# Patient Record
Sex: MALE | Race: OTHER | HISPANIC OR LATINO | Employment: FULL TIME | URBAN - METROPOLITAN AREA
[De-identification: names, ages, dates, MRNs, and addresses within clinical notes are randomized per-mention and may not be internally consistent; named-entity substitution may affect disease eponyms.]

---

## 2017-02-09 ENCOUNTER — OFFICE VISIT (OUTPATIENT)
Dept: MEDICAL GROUP | Facility: MEDICAL CENTER | Age: 52
End: 2017-02-09
Payer: COMMERCIAL

## 2017-02-09 VITALS
SYSTOLIC BLOOD PRESSURE: 118 MMHG | HEART RATE: 80 BPM | RESPIRATION RATE: 16 BRPM | WEIGHT: 201 LBS | OXYGEN SATURATION: 94 % | DIASTOLIC BLOOD PRESSURE: 62 MMHG | BODY MASS INDEX: 33.49 KG/M2 | HEIGHT: 65 IN | TEMPERATURE: 98.4 F

## 2017-02-09 DIAGNOSIS — E66.9 OBESITY (BMI 30-39.9): ICD-10-CM

## 2017-02-09 DIAGNOSIS — K21.9 GASTROESOPHAGEAL REFLUX DISEASE WITHOUT ESOPHAGITIS: ICD-10-CM

## 2017-02-09 DIAGNOSIS — Z00.00 ANNUAL PHYSICAL EXAM: ICD-10-CM

## 2017-02-09 DIAGNOSIS — G47.33 OSA (OBSTRUCTIVE SLEEP APNEA): ICD-10-CM

## 2017-02-09 DIAGNOSIS — L60.0 NAIL, INGROWN: ICD-10-CM

## 2017-02-09 PROCEDURE — 99396 PREV VISIT EST AGE 40-64: CPT | Performed by: NURSE PRACTITIONER

## 2017-02-09 ASSESSMENT — PATIENT HEALTH QUESTIONNAIRE - PHQ9: CLINICAL INTERPRETATION OF PHQ2 SCORE: 0

## 2017-02-09 NOTE — MR AVS SNAPSHOT
"Judah Olivera   2017 2:00 PM   Office Visit   MRN: [id number]    Department:  [facility]   Dept Phone:  [phone]    Description:  Male : 1965   Provider:  INDIO Keen           Reason for Visit     Follow-Up           Allergies as of 2017     No Known Allergies      You were diagnosed with     Annual physical exam   [296009]       Gastroesophageal reflux disease without esophagitis   [007391]       Nail, ingrown   [005589]       JOSE (obstructive sleep apnea)   [234386]         Vital Signs     Blood Pressure Pulse Temperature Respirations Height Weight    118/62 mmHg 80 36.9 °C (98.4 °F) 16 1.651 m (5' 5\") 91.173 kg (201 lb)    Body Mass Index Oxygen Saturation Smoking Status             33.45 kg/m2 94% Former Smoker         Basic Information     Date Of Birth Sex Race Ethnicity Preferred Language    1965 Male Other  Origin (Korean,Rwandan,North Korean,[place]end[place], etc) English      Problem List              ICD-10-CM Priority Class Noted - Resolved    Adult BMI 32.0-32.9 kg/sq m Z68.32   2016 - Present    Gout M10.9   2016 - Present    History of hematuria Z87.448   2016 - Present    Gastroesophageal reflux disease without esophagitis K21.9   2016 - Present    Prediabetes R73.03   2016 - Present    JOSE (obstructive sleep apnea) G47.33   10/5/2016 - Present      Health Maintenance        Date Due Completion Dates    IMM DTaP/Tdap/Td Vaccine (1 - Tdap) 1985 ---    IMM INFLUENZA (1) 2016 ---    COLONOSCOPY 2026            Current Immunizations     No immunizations on file.      Below and/or attached are the medications your provider expects you to take. Review all of your home medications and newly ordered medications with your provider and/or pharmacist. Follow medication instructions as directed by your provider and/or pharmacist. Please keep your medication list with you and share with your provider. Update the " information when medications are discontinued, doses are changed, or new medications (including over-the-counter products) are added; and carry medication information at all times in the event of emergency situations     Allergies:  No Known Allergies          Medications  Valid as of: February 09, 2017 -  3:23 PM    Generic Name Brand Name Tablet Size Instructions for use    Acetaminophen   Take  by mouth.        Cyclobenzaprine HCl (Tab) FLEXERIL 10 MG Take 1 Tab by mouth 3 times a day as needed.        Ibuprofen (Tab) MOTRIN 200 MG Take 200 mg by mouth every 6 hours as needed.        Indomethacin (Cap) INDOCIN 50 MG Take 1 Cap by mouth 3 times a day.        Naproxen (Tab) NAPROSYN 500 MG 1 tab PO twice daily x 1 week then twice daily as needed, take with food        Zolpidem Tartrate (Tab) AMBIEN 5 MG Take 1-2 tablets by mouth every evening as needed for insomnia. Bring to sleep study.        .                 Medicines prescribed today were sent to:     [facility] [address] [place] [address] (S), [place] - [address]    [facility] ([place] [place] [address]    Phone: [phone] Fax: [phone]    Open 24 Hours?: No      Medication refill instructions:       If your prescription bottle indicates you have medication refills left, it is not necessary to call your provider’s office. Please contact your pharmacy and they will refill your medication.    If your prescription bottle indicates you do not have any refills left, you may request refills at any time through one of the following ways: The online [employer] system (except Urgent Care), by calling your provider’s office, or by asking your pharmacy to contact your provider’s office with a refill request. Medication refills are processed only during regular business hours and may not be available until the next business day. Your provider may request additional information or to have a follow-up visit with you prior to refilling your medication.   *Please Note:  Medication refills are assigned a new Rx number when refilled electronically. Your pharmacy may indicate that no refills were authorized even though a new prescription for the same medication is available at the pharmacy. Please request the medicine by name with the pharmacy before contacting your provider for a refill.        Your To Do List     Future Labs/Procedures Complete By Expires    COMP METABOLIC PANEL  As directed 2/10/2018    LIPID PROFILE  As directed 2/10/2018      Referral     A referral request has been sent to our patient care coordination department. Please allow 3-5 business days for us to process this request and contact you either by phone or mail. If you do not hear from us by the 5th business day, please call us at [phone].           Verisim Access Code: [email or web address]  Expires: 2/23/2017 11:26 AM    Verisim  A secure, online tool to manage your health information     [employer]’s Verisim® is a secure, online tool that connects you to your personalized health information from the privacy of your home -- day or night - making it very easy for you to manage your healthcare. Once the activation process is completed, you can even access your medical information using the Verisim nemo, which is available for free in the Apple Nemo store or Google Play store.     Verisim provides the following levels of access (as shown below):   My Chart Features   [facility] Doctor [facility]  [facility]ists [facility]  Urgent  Care Non-[facility]  Primary Care  Doctor   Email your healthcare team securely and privately 24/7 X X X    Manage appointments: schedule your next appointment; view details of past/upcoming appointments X      Request prescription refills. X      View recent personal medical records, including lab and immunizations X X X X   View health record, including health history, allergies, medications X X X X   Read reports about your outpatient visits, procedures, consult and ER notes X X X X    See your discharge summary, which is a recap of your hospital and/or ER visit that includes your diagnosis, lab results, and care plan. X X       How to register for [employer]:  1. Go to  [email or web address].  2. Click on the Sign Up Now box, which takes you to the New Member Sign Up page. You will need to provide the following information:  a. Enter your [employer] Access Code exactly as it appears at the top of this page. (You will not need to use this code after you’ve completed the sign-up process. If you do not sign up before the expiration date, you must request a new code.)   b. Enter your date of birth.   c. Enter your home email address.   d. Click Submit, and follow the next screen’s instructions.  3. Create a [employer] ID. This will be your [employer] login ID and cannot be changed, so think of one that is secure and easy to remember.  4. Create a [employer] password. You can change your password at any time.  5. Enter your Password Reset Question and Answer. This can be used at a later time if you forget your password.   6. Enter your e-mail address. This allows you to receive e-mail notifications when new information is available in [employer].  7. Click Sign Up. You can now view your health information.    For assistance activating your [employer] account, call [phone]

## 2017-02-09 NOTE — ASSESSMENT & PLAN NOTE
Using CPAP most nights of the week  Sleeping well  Good energy level, no daytime fatigue  He is due for follow-up with pulmonology

## 2017-02-09 NOTE — PROGRESS NOTES
"Subjective:     Chief Complaint   Patient presents with   • Follow-Up     Judah Olivera is a 51 y.o. male here today for annual exam and labs as required by insurance. He has no acute complaints. We discussed:    Gastroesophageal reflux disease without esophagitis  Managed with prilosec, has tried to d/c but symptoms return  Asymptomatic with medication  JOSE (obstructive sleep apnea)  Using CPAP most nights of the week  Sleeping well  Good energy level, no daytime fatigue  He is due for follow-up with pulmonology  Obesity (BMI 30-39.9)  Gradual weight gain, admits that he is not exercising or watching his diet     Colonoscopy recently completed and normal, mild prostate enlargement noted. He denies urinary difficulty, hesitancy, frequency    Current medicines (including changes today)  Current Outpatient Prescriptions   Medication Sig Dispense Refill   • Acetaminophen (ARTHRITIS PAIN RELIEF PO) Take  by mouth.     • ibuprofen (MOTRIN) 200 MG Tab Take 200 mg by mouth every 6 hours as needed.       No current facility-administered medications for this visit.     He  has a past medical history of Hyperlipidemia; Back pain; Sleep apnea; and Chickenpox. He also has no past medical history of Asthma or Diabetes (CMS-[facility]).    ROS included above     Objective:     Blood pressure 118/62, pulse 80, temperature 36.9 °C (98.4 °F), resp. rate 16, height 1.651 m (5' 5\"), weight 91.173 kg (201 lb), SpO2 94 %. Body mass index is 33.45 kg/(m^2).     Physical Exam:  General: Alert, oriented in no acute distress.  Eye contact is good, speech is normal, affect calm  HEENT: PERRL, EOMI, Oral mucosa pink moist, no lesions. TMs gray with good landmarks bilaterally. No lymphadenopathy.  Lungs: clear to auscultation bilaterally, normal effort, no wheeze/ rhonchi/ rales.  CV: regular rate and rhythm, S1, S2, no murmur. No JVD, no carotid bruit. Pedal pulses 2+ bilaterally   Abdomen: soft, nontender, No CVAT, no hepatosplenomegaly  MS: No " joint swelling or redness, strength is 5 out of 5 globally  Neuro: DTRs 2+ at bilateral lower extremities, negative Romberg  Ext: no edema, color normal, vascularity normal, temperature normal    Assessment and Plan:   The following treatment plan was discussed   1. Annual physical exam   normal exam except as discussed below. Health promotion topics reviewed including healthy diet, regular exercise, weight loss recommended. Labs as listed below, will complete insurance form upon receipt of results  COMP METABOLIC PANEL    LIPID PROFILE   2. Gastroesophageal reflux disease without esophagitis   stable    3. Nail, ingrown   intermittent difficulty with the nail of the large toe on the left foot  REFERRAL TO PODIATRY   4. JOSE (obstructive sleep apnea)   using CPAP, advised follow-up with pulmonology    5. Obesity (BMI 30-39.9)  Patient identified as having weight management issue.  Appropriate orders and counseling given.       Followup: Pending labs         Please note that this dictation was created using voice recognition software. I have worked with consultants from the vendor as well as technical experts from [employer] to optimize the interface. I have made every reasonable attempt to correct obvious errors, but I expect that there are errors of grammar and possibly content that I did not discover before finalizing the note.

## 2017-02-15 ENCOUNTER — TELEPHONE (OUTPATIENT)
Dept: MEDICAL GROUP | Facility: MEDICAL CENTER | Age: 52
End: 2017-02-15

## 2017-02-15 DIAGNOSIS — M10.00 IDIOPATHIC GOUT, UNSPECIFIED CHRONICITY, UNSPECIFIED SITE: ICD-10-CM

## 2017-02-15 NOTE — TELEPHONE ENCOUNTER
1. Caller Name: Judah                      Call Back Number: [phone]    2. Message: Pt would like to know if you can order blood test to check for gout.     3. Patient approves office to leave a detailed voicemail/MyChart message: yes

## 2017-02-23 LAB
ALBUMIN SERPL-MCNC: 4.5 G/DL (ref 3.5–5.5)
ALBUMIN/GLOB SERPL: 1.4 {RATIO} (ref 1.1–2.5)
ALP SERPL-CCNC: 69 IU/L (ref 39–117)
ALT SERPL-CCNC: 29 IU/L (ref 0–44)
AST SERPL-CCNC: 19 IU/L (ref 0–40)
BILIRUB SERPL-MCNC: 0.8 MG/DL (ref 0–1.2)
BUN SERPL-MCNC: 15 MG/DL (ref 6–24)
BUN/CREAT SERPL: 18 (ref 9–20)
CALCIUM SERPL-MCNC: 9.5 MG/DL (ref 8.7–10.2)
CHLORIDE SERPL-SCNC: 103 MMOL/L (ref 96–106)
CHOLEST SERPL-MCNC: 165 MG/DL (ref 100–199)
CO2 SERPL-SCNC: 22 MMOL/L (ref 18–29)
COMMENT 011824: ABNORMAL
CREAT SERPL-MCNC: 0.82 MG/DL (ref 0.76–1.27)
GLOBULIN SER CALC-MCNC: 3.2 G/DL (ref 1.5–4.5)
GLUCOSE SERPL-MCNC: 105 MG/DL (ref 65–99)
HDLC SERPL-MCNC: 40 MG/DL
LDLC SERPL CALC-MCNC: 103 MG/DL (ref 0–99)
POTASSIUM SERPL-SCNC: 4.2 MMOL/L (ref 3.5–5.2)
PROT SERPL-MCNC: 7.7 G/DL (ref 6–8.5)
SODIUM SERPL-SCNC: 139 MMOL/L (ref 134–144)
TRIGL SERPL-MCNC: 110 MG/DL (ref 0–149)
URATE SERPL-MCNC: 8.4 MG/DL (ref 3.7–8.6)
VLDLC SERPL CALC-MCNC: 22 MG/DL (ref 5–40)

## 2017-02-24 ENCOUNTER — TELEPHONE (OUTPATIENT)
Dept: MEDICAL GROUP | Facility: MEDICAL CENTER | Age: 52
End: 2017-02-24

## 2017-02-24 NOTE — TELEPHONE ENCOUNTER
----- Message from INDIO Keen sent at 2/23/2017  2:54 PM PST -----  Informed patient:  Labs show normal kidney function, liver function, electrolytes, and uric acid level. Glucose is slightly elevated at 105, normal being under 100. Overall cholesterol looks fine. Would recommend increasing exercise, reduce carbohydrates and sugars to help keep glucose down.  Insurance form completed, please fax

## 2017-04-20 ENCOUNTER — OFFICE VISIT (OUTPATIENT)
Dept: MEDICAL GROUP | Facility: MEDICAL CENTER | Age: 52
End: 2017-04-20
Payer: COMMERCIAL

## 2017-04-20 VITALS
SYSTOLIC BLOOD PRESSURE: 118 MMHG | BODY MASS INDEX: 33.82 KG/M2 | HEIGHT: 65 IN | OXYGEN SATURATION: 95 % | TEMPERATURE: 98.7 F | DIASTOLIC BLOOD PRESSURE: 70 MMHG | HEART RATE: 91 BPM | WEIGHT: 203 LBS

## 2017-04-20 DIAGNOSIS — I45.5 SINUS PAUSE: ICD-10-CM

## 2017-04-20 DIAGNOSIS — R00.2 PALPITATION: ICD-10-CM

## 2017-04-20 DIAGNOSIS — R94.31 ABNORMAL EKG: ICD-10-CM

## 2017-04-20 PROCEDURE — 99214 OFFICE O/P EST MOD 30 MIN: CPT | Performed by: NURSE PRACTITIONER

## 2017-04-20 PROCEDURE — 93000 ELECTROCARDIOGRAM COMPLETE: CPT | Performed by: NURSE PRACTITIONER

## 2017-04-20 NOTE — MR AVS SNAPSHOT
"        Judah Olivera   2017 11:00 AM   Office Visit   MRN: [id number]    Department:  [facility]   Dept Phone:  [phone]    Description:  Male : 1965   Provider:  INDIO Keen           Reason for Visit     Irregular Heart Beat x1 month       Allergies as of 2017     No Known Allergies      You were diagnosed with     Palpitation   [231716]       Sinus pause   [652196]       Abnormal EKG   [682356]         Vital Signs     Blood Pressure Pulse Temperature Height Weight Body Mass Index    118/70 mmHg 91 37.1 °C (98.7 °F) 1.651 m (5' 5\") 92.08 kg (203 lb) 33.78 kg/m2    Oxygen Saturation Smoking Status                95% Former Smoker          Basic Information     Date Of Birth Sex Race Ethnicity Preferred Language    1965 Male Other  Origin (Maori,Malian,Citizen of [place],Australian, etc) English      Problem List              ICD-10-CM Priority Class Noted - Resolved    Gout M10.9   2016 - Present    History of hematuria Z87.448   2016 - Present    Gastroesophageal reflux disease without esophagitis K21.9   2016 - Present    Prediabetes R73.03   2016 - Present    JOSE (obstructive sleep apnea) G47.33   10/5/2016 - Present    Obesity (BMI 30-39.9) E66.9   2017 - Present      Health Maintenance        Date Due Completion Dates    IMM DTaP/Tdap/Td Vaccine (1 - Tdap) 1985 ---    COLONOSCOPY 2026            Current Immunizations     No immunizations on file.      Below and/or attached are the medications your provider expects you to take. Review all of your home medications and newly ordered medications with your provider and/or pharmacist. Follow medication instructions as directed by your provider and/or pharmacist. Please keep your medication list with you and share with your provider. Update the information when medications are discontinued, doses are changed, or new medications (including over-the-counter products) are added; and " carry medication information at all times in the event of emergency situations     Allergies:  No Known Allergies          Medications  Valid as of: April 20, 2017 - 12:12 PM    Generic Name Brand Name Tablet Size Instructions for use    Acetaminophen   Take  by mouth.        Ibuprofen (Tab) MOTRIN 200 MG Take 200 mg by mouth every 6 hours as needed.        .                 Medicines prescribed today were sent to:     [facility]  [place] ([place]), [place] - [address] [facility] (S) [place] [address]    Phone: [phone] Fax: [phone]    Open 24 Hours?: No      Medication refill instructions:       If your prescription bottle indicates you have medication refills left, it is not necessary to call your provider’s office. Please contact your pharmacy and they will refill your medication.    If your prescription bottle indicates you do not have any refills left, you may request refills at any time through one of the following ways: The online [employer] system (except Urgent Care), by calling your provider’s office, or by asking your pharmacy to contact your provider’s office with a refill request. Medication refills are processed only during regular business hours and may not be available until the next business day. Your provider may request additional information or to have a follow-up visit with you prior to refilling your medication.   *Please Note: Medication refills are assigned a new Rx number when refilled electronically. Your pharmacy may indicate that no refills were authorized even though a new prescription for the same medication is available at the pharmacy. Please request the medicine by name with the pharmacy before contacting your provider for a refill.        Your To Do List     Future Labs/Procedures Complete By Expires    ECHOCARDIOGRAM COMP W/O CONT  As directed 4/20/2018    Scheduling Instructions:    routine      Referral     A referral request has been sent to our patient care  coordination department. Please allow 3-5 business days for us to process this request and contact you either by phone or mail. If you do not hear from us by the 5th business day, please call us at [phone].           BadAbroad Access Code: [email or web address]  Expires: 4/21/2017 11:52 AM    BadAbroad  A secure, online tool to manage your health information     [employer]’s BadAbroad® is a secure, online tool that connects you to your personalized health information from the privacy of your home -- day or night - making it very easy for you to manage your healthcare. Once the activation process is completed, you can even access your medical information using the BadAbroad nemo, which is available for free in the Apple Nemo store or Google Play store.     BadAbroad provides the following levels of access (as shown below):   My Chart Features   [facility] Doctor [facility]  Specialists [facility]  Urgent  Care [facility]  Primary Care  Doctor   Email your healthcare team securely and privately 24/7 X X X    Manage appointments: schedule your next appointment; view details of past/upcoming appointments X      Request prescription refills. X      View recent personal medical records, including lab and immunizations X X X X   View health record, including health history, allergies, medications X X X X   Read reports about your outpatient visits, procedures, consult and ER notes X X X X   See your discharge summary, which is a recap of your hospital and/or ER visit that includes your diagnosis, lab results, and care plan. X X       How to register for BadAbroad:  1. Go to  [email or web address].  2. Click on the Sign Up Now box, which takes you to the New Member Sign Up page. You will need to provide the following information:  a. Enter your BadAbroad Access Code exactly as it appears at the top of this page. (You will not need to use this code after you’ve completed the sign-up process. If you do not sign up before the  expiration date, you must request a new code.)   b. Enter your date of birth.   c. Enter your home email address.   d. Click Submit, and follow the next screen’s instructions.  3. Create a [employer] ID. This will be your [employer] login ID and cannot be changed, so think of one that is secure and easy to remember.  4. Create a [employer] password. You can change your password at any time.  5. Enter your Password Reset Question and Answer. This can be used at a later time if you forget your password.   6. Enter your e-mail address. This allows you to receive e-mail notifications when new information is available in [employer].  7. Click Sign Up. You can now view your health information.    For assistance activating your [employer] account, call [phone]

## 2017-04-20 NOTE — PROGRESS NOTES
"Subjective:     Chief Complaint   Patient presents with   • Irregular Heart Beat     x1 month      Judah Olivera is a 51 y.o. male established patient here for evaluation of what he feels his irregular heartbeat. He feels like his heart \"skips a beat\" for a second or 2. He is feeling this 4-5 times daily over the last few weeks. No identifiable exacerbating or alleviating factors, has occurred both with activity and at rest. Does not necessarily feel short of breath when this happens. He does feel like he needs to take a deep breath following the events. There is no pain, dizziness, syncope, rapid heart rate.  No history of hypertension, CAD. Occasional alcohol, occasional caffeine, no tobacco, no drugs    Current medicines (including changes today)  Current Outpatient Prescriptions   Medication Sig Dispense Refill   • Acetaminophen (ARTHRITIS PAIN RELIEF PO) Take  by mouth.     • ibuprofen (MOTRIN) 200 MG Tab Take 200 mg by mouth every 6 hours as needed.       No current facility-administered medications for this visit.     He  has a past medical history of Hyperlipidemia; Back pain; Sleep apnea; and Chickenpox. He also has no past medical history of Asthma or Diabetes (CMS-[facility]).    ROS included above     Objective:     Blood pressure 118/70, pulse 91, temperature 37.1 °C (98.7 °F), height 1.651 m (5' 5\"), weight 92.08 kg (203 lb), SpO2 95 %. Body mass index is 33.78 kg/(m^2).     Physical Exam:  General: Alert, oriented in no acute distress.  Eye contact is good, speech is normal, affect calm  Lungs: clear to auscultation bilaterally, normal effort, no wheeze/ rhonchi/ rales.  CV: regular rate and rhythm, S1, S2, no murmur, no rub. No JVD, no carotid bruit  Abdomen: soft, nontender  Ext: no edema, color normal, vascularity normal, temperature normal  EKG: sinus rhythm rate of 85, one PVC followed by sinus pause   Assessment and Plan:   The following treatment plan was discussed   1. Palpitation   symptoms described " likely related to PVC, he does seem to have a sinus pause following the event captured on EKG today in the office. We will check echo as listed below and refer to cardiology for further evaluation. Patient's advised to be reevaluated for increased frequency or development of shortness of breath, dizziness, or syncopal event. Avoid etoh and caffeine for now.  ECHOCARDIOGRAM COMP W/O CONT   2. Sinus pause  ECHOCARDIOGRAM COMP W/O CONT    REFERRAL TO CARDIOLOGY   3. Abnormal EKG  REFERRAL TO CARDIOLOGY       Followup: pending tests         Please note that this dictation was created using voice recognition software. I have worked with consultants from the vendor as well as technical experts from [facility] to optimize the interface. I have made every reasonable attempt to correct obvious errors, but I expect that there are errors of grammar and possibly content that I did not discover before finalizing the note.

## 2017-05-08 ENCOUNTER — OFFICE VISIT (OUTPATIENT)
Dept: CARDIOLOGY | Facility: MEDICAL CENTER | Age: 52
End: 2017-05-08
Payer: COMMERCIAL

## 2017-05-08 VITALS
SYSTOLIC BLOOD PRESSURE: 130 MMHG | OXYGEN SATURATION: 95 % | BODY MASS INDEX: 33.82 KG/M2 | HEART RATE: 102 BPM | DIASTOLIC BLOOD PRESSURE: 76 MMHG | HEIGHT: 65 IN | WEIGHT: 203 LBS

## 2017-05-08 DIAGNOSIS — R73.03 PREDIABETES: ICD-10-CM

## 2017-05-08 DIAGNOSIS — R00.2 PALPITATIONS: ICD-10-CM

## 2017-05-08 DIAGNOSIS — E66.9 OBESITY (BMI 30-39.9): ICD-10-CM

## 2017-05-08 PROCEDURE — 93000 ELECTROCARDIOGRAM COMPLETE: CPT | Performed by: INTERNAL MEDICINE

## 2017-05-08 PROCEDURE — 99204 OFFICE O/P NEW MOD 45 MIN: CPT | Performed by: INTERNAL MEDICINE

## 2017-05-08 NOTE — MR AVS SNAPSHOT
"        Judah Olivera   2017 4:20 PM   Office Visit   MRN: [id number]    Department:  [facility]   Dept Phone:  [phone]    Description:  Male : 1965   Provider:  Tosin Aguayo M.D.           Reason for Visit     New Patient           Allergies as of 2017     No Known Allergies      You were diagnosed with     Palpitations   [785.1.ICD-9-CM]         Vital Signs     Blood Pressure Pulse Height Weight Body Mass Index Oxygen Saturation    130/76 mmHg 102 1.651 m (5' 5\") 92.08 kg (203 lb) 33.78 kg/m2 95%    Smoking Status                   Former Smoker           Basic Information     Date Of Birth Sex Race Ethnicity Preferred Language    1965 Male Other  Origin (Emirati,Paraguayan,Sao Tomean,South Sudanese, etc) English      Your appointments     May 18, 2017  2:00 PM   HOLTER MONITOR 24 HOURS with [facility]TER[facility]   [facility] (--)    [address], [address]  [place] [id number] [phone]            May 24, 2017 12:15 PM   ECHO with ECHO [facility]   ECHOCARDIOLOGY [facility])    [address]  [place] [address]   [phone]           No prep            May 31, 2017 10:00 AM   FOLLOW UP with Tosin Aguayo M.D.   [facility] (--)    [address], [address]  [place] [address]   [phone]              Problem List              ICD-10-CM Priority Class Noted - Resolved    Gout M10.9   2016 - Present    History of hematuria Z87.448   2016 - Present    Gastroesophageal reflux disease without esophagitis K21.9   2016 - Present    Prediabetes R73.03   2016 - Present    JOSE (obstructive sleep apnea) G47.33   10/5/2016 - Present    Obesity (BMI 30-39.9) E66.9   2017 - Present      Health Maintenance        Date Due Completion Dates    IMM DTaP/Tdap/Td Vaccine (1 - Tdap) 1985 ---    COLONOSCOPY 2026            Results       Current Immunizations     No immunizations on file.  "      Below and/or attached are the medications your provider expects you to take. Review all of your home medications and newly ordered medications with your provider and/or pharmacist. Follow medication instructions as directed by your provider and/or pharmacist. Please keep your medication list with you and share with your provider. Update the information when medications are discontinued, doses are changed, or new medications (including over-the-counter products) are added; and carry medication information at all times in the event of emergency situations     Allergies:  No Known Allergies          Medications  Valid as of: May 08, 2017 -  4:53 PM    Generic Name Brand Name Tablet Size Instructions for use    Acetaminophen   Take  by mouth.        Ibuprofen (Tab) MOTRIN 200 MG Take 200 mg by mouth every 6 hours as needed.        .                 Medicines prescribed today were sent to:     [facility] [address] [place] [address] (S), [place] - [address]    [facility] [place] ([place]) [place] [address]    Phone: [phone] Fax: [phone]    Open 24 Hours?: No      Medication refill instructions:       If your prescription bottle indicates you have medication refills left, it is not necessary to call your provider’s office. Please contact your pharmacy and they will refill your medication.    If your prescription bottle indicates you do not have any refills left, you may request refills at any time through one of the following ways: The online [employer]ph[employer]guides system (except Urgent Care), by calling your provider’s office, or by asking your pharmacy to contact your provider’s office with a refill request. Medication refills are processed only during regular business hours and may not be available until the next business day. Your provider may request additional information or to have a follow-up visit with you prior to refilling your medication.   *Please Note: Medication refills are assigned a new Rx number when refilled  electronically. Your pharmacy may indicate that no refills were authorized even though a new prescription for the same medication is available at the pharmacy. Please request the medicine by name with the pharmacy before contacting your provider for a refill.        Your To Do List     Future Labs/Procedures Complete By Expires    HOLTER MONITOR STUDY  As directed 5/8/2018         MyChart Status: Patient Declined

## 2017-05-09 LAB — EKG IMPRESSION: NORMAL

## 2017-05-09 NOTE — PROGRESS NOTES
Arrhythmia Clinic Note (New patient)     DOS: 5/9/2017    Referring physician: Ameena Ramírez    Chief complaint/Reason for consult: Palpitations    HPI:  Patient is a 52 yo with history of obesity and pre-diabetes, in his usual state of health, until several weeks ago. He began to develop palpitations. He says these are mild in severity but frequent. It happens throughout the day he could feel his heart racing for a few seconds at a time over his left chest. Not associated with CP or shortness of breath. This is a new problem for him. He saw his PCP who obtained an EKG showing a PVC. He was referred for further management.    ROS (+ highlighted in red):  Constitutional: Fevers/chills/fatigue/weightloss  HEENT: Blurry vision/eye pain/sore throat/hearing loss  Respiratory: Shortness of breath/cough  Cardiovascular: Chest pain/palpitations/edema/orthopnea/syncope  GI: Nausea/vomitting/diarrhea  MSK: Arthralgias/myagias/muscle weakness  Skin: Rash/sores  Neurological: Numbness/tremors/vertigo  Endocrine: Excessive thirst/polyuria/cold intolerance/heat intolerance  Psych: Depression/anxiety    Past Medical History   Diagnosis Date   • Hyperlipidemia    • Back pain    • Sleep apnea    • Chickenpox        Past Surgical History   Procedure Laterality Date   • Tonsillectomy         Social History     Social History   • Marital Status:      Spouse Name: N/A   • Number of Children: N/A   • Years of Education: N/A     Occupational History   • Not on file.     Social History Main Topics   • Smoking status: Former Smoker -- 0.25 packs/day for 15 years     Types: Cigarettes   • Smokeless tobacco: Never Used   • Alcohol Use: 0.0 - 1.2 oz/week     0-1 Cans of beer, 0-1 Shots of liquor, 0 Standard drinks or equivalent per week   • Drug Use: No   • Sexual Activity: Not on file     Other Topics Concern   • Not on file     Social History Narrative       Family History   Problem Relation Age of Onset   • Diabetes Mother    • Lung  "Disease Father    • Diabetes Sister    • Sleep Apnea Neg Hx        No Known Allergies    Current Outpatient Prescriptions   Medication Sig Dispense Refill   • Acetaminophen (ARTHRITIS PAIN RELIEF PO) Take  by mouth.     • ibuprofen (MOTRIN) 200 MG Tab Take 200 mg by mouth every 6 hours as needed.       No current facility-administered medications for this visit.       Physical Exam:  Filed Vitals:    05/08/17 1630   BP: 130/76   Pulse: 102   Height: 1.651 m (5' 5\")   Weight: 92.08 kg (203 lb)   SpO2: 95%     General appearance: NAD, conversant   Eyes: anicteric sclerae, moist conjunctivae; no lid-lag; PERRLA  HENT: Atraumatic; oropharynx clear with moist mucous membranes and no mucosal ulcerations; normal hard and soft palate  Neck: Trachea midline; FROM, supple, no thyromegaly or lymphadenopathy  Lungs: CTA, with normal respiratory effort and no intercostal retractions  CV: RRR, no MRGs, no JVD   Abdomen: Soft, non-tender; no masses or HSM  Extremities: No peripheral edema or extremity lymphadenopathy  Skin: Normal temperature, turgor and texture; no rash, ulcers or subcutaneous nodules  Psych: Appropriate affect, alert and oriented to person, place and time    Data:  Outside records reviewed, single PVC seen on EKG    EKG interpreted by me:  Sinus, LAE    Impression/Plan:  1. Palpitations  EKG    HOLTER MONITOR STUDY   2. Obesity (BMI 30-39.9)     3. Prediabetes       -Holter to see what his palpitations  -I counseled him regarding weightloss and diet  -RTC after holter    Tosin Aguayo MD      "

## 2017-05-18 ENCOUNTER — NON-PROVIDER VISIT (OUTPATIENT)
Dept: CARDIOLOGY | Facility: MEDICAL CENTER | Age: 52
End: 2017-05-18
Payer: COMMERCIAL

## 2017-05-18 DIAGNOSIS — R00.2 PALPITATIONS: ICD-10-CM

## 2017-05-18 DIAGNOSIS — I49.3 PVC (PREMATURE VENTRICULAR CONTRACTION): ICD-10-CM

## 2017-05-18 DIAGNOSIS — R00.0 SINUS TACHYCARDIA: ICD-10-CM

## 2017-05-23 DIAGNOSIS — R00.2 PALPITATIONS: ICD-10-CM

## 2017-05-23 LAB — EKG IMPRESSION: NORMAL

## 2017-05-23 PROCEDURE — 93224 XTRNL ECG REC UP TO 48 HRS: CPT | Performed by: INTERNAL MEDICINE

## 2017-05-30 ENCOUNTER — TELEPHONE (OUTPATIENT)
Dept: CARDIOLOGY | Facility: MEDICAL CENTER | Age: 52
End: 2017-05-30

## 2017-05-30 NOTE — TELEPHONE ENCOUNTER
Called pt. To give results. He has FV 6-7-17 with Dr. Aguayo. Pt. Has decided not to proceed with echo ordered by Dr. Ramírez.

## 2017-07-03 ENCOUNTER — OFFICE VISIT (OUTPATIENT)
Dept: CARDIOLOGY | Facility: MEDICAL CENTER | Age: 52
End: 2017-07-03
Payer: COMMERCIAL

## 2017-07-03 VITALS
HEART RATE: 84 BPM | WEIGHT: 206 LBS | SYSTOLIC BLOOD PRESSURE: 128 MMHG | BODY MASS INDEX: 34.32 KG/M2 | DIASTOLIC BLOOD PRESSURE: 78 MMHG | OXYGEN SATURATION: 93 % | HEIGHT: 65 IN

## 2017-07-03 DIAGNOSIS — R00.2 PALPITATIONS: ICD-10-CM

## 2017-07-03 DIAGNOSIS — I49.3 FREQUENT PVCS: Primary | ICD-10-CM

## 2017-07-03 PROCEDURE — 99214 OFFICE O/P EST MOD 30 MIN: CPT | Performed by: INTERNAL MEDICINE

## 2017-07-03 NOTE — PROGRESS NOTES
"Arrhythmia Clinic Note (Established patient)    DOS:[unfilled]    Chief complaint/Reason for consult:    Interval History:  Patient is a 51 yo M with history of pre-diabetes and palpitations that underwent Holter showing around ~2K PVCs. He says since our visit his palpitations have mostly resolved. He is here for follow-up to discuss his holter results.    ROS (+ highlighted in red):  Constitutional: Fevers/chills/fatigue/weightloss  Respiratory: Shortness of breath/cough  Cardiovascular: Chest pain/palpitations/edema/orthopnea/syncope    Past Medical History   Diagnosis Date   • Hyperlipidemia    • Back pain    • Sleep apnea    • Chickenpox        No Known Allergies    Current Outpatient Prescriptions   Medication Sig Dispense Refill   • Acetaminophen (ARTHRITIS PAIN RELIEF PO) Take  by mouth.     • ibuprofen (MOTRIN) 200 MG Tab Take 200 mg by mouth every 6 hours as needed.       No current facility-administered medications for this visit.       Physical Exam:  Filed Vitals:    07/03/17 1641   BP: 128/78   Pulse: 84   Height: 1.651 m (5' 5\")   Weight: 93.441 kg (206 lb)   SpO2: 93%     General appearance: NAD, conversant   Eyes: anicteric sclerae, moist conjunctivae; no lid-lag; PERRLA  HENT: Atraumatic; oropharynx clear with moist mucous membranes and no mucosal ulcerations; normal hard and soft palate  Neck: Trachea midline; FROM, supple, no thyromegaly or lymphadenopathy  Lungs: CTA, with normal respiratory effort and no intercostal retractions  CV: RRR, no MRGs, no JVD  Abdomen: Soft, non-tender; no masses or HSM  Extremities: No peripheral edema or extremity lymphadenopathy  Skin: Normal temperature, turgor and texture; no rash, ulcers or subcutaneous nodules  Psych: Appropriate affect, alert and oriented to person, place and time    Data:  Labs reviewed    Holter reviewed    EKG interpreted by me:  NSR    Impression/Plan:  1. Frequent PVCs     2. Palpitations       -I offered him reassurance that his PVCs " were not dangerous  -He says his symptoms have mostly resolved  -He declined to be on any medical therapy  -He can f/u PRLUCÍA Aguayo MD

## 2017-07-03 NOTE — MR AVS SNAPSHOT
"        Judah Olivera   7/3/2017 4:20 PM   Office Visit   MRN: [id number]    Department:  [facility]   Dept Phone:  [phone]    Description:  Male : 1965   Provider:  Tosin Aguayo M.D.           Reason for Visit     Follow-Up           Allergies as of 7/3/2017     No Known Allergies      Vital Signs     Blood Pressure Pulse Height Weight Body Mass Index Oxygen Saturation    128/78 mmHg 84 1.651 m (5' 5\") 93.441 kg (206 lb) 34.28 kg/m2 93%    Smoking Status                   Former Smoker           Basic Information     Date Of Birth Sex Race Ethnicity Preferred Language    1965 Male Other  Origin (Amharic,Maldivian,Algerian,Mendel, etc) English      Problem List              ICD-10-CM Priority Class Noted - Resolved    Gout M10.9   2016 - Present    History of hematuria Z87.448   2016 - Present    Gastroesophageal reflux disease without esophagitis K21.9   2016 - Present    Prediabetes R73.03   2016 - Present    JOSE (obstructive sleep apnea) G47.33   10/5/2016 - Present    Obesity (BMI 30-39.9) E66.9   2017 - Present      Health Maintenance        Date Due Completion Dates    IMM DTaP/Tdap/Td Vaccine (1 - Tdap) 1985 ---    IMM INFLUENZA (1) 2017 ---    COLONOSCOPY 2026            Current Immunizations     No immunizations on file.      Below and/or attached are the medications your provider expects you to take. Review all of your home medications and newly ordered medications with your provider and/or pharmacist. Follow medication instructions as directed by your provider and/or pharmacist. Please keep your medication list with you and share with your provider. Update the information when medications are discontinued, doses are changed, or new medications (including over-the-counter products) are added; and carry medication information at all times in the event of emergency situations     Allergies:  No Known Allergies          Medications  " Valid as of: July 03, 2017 -  4:54 PM    Generic Name Brand Name Tablet Size Instructions for use    Acetaminophen   Take  by mouth.        Ibuprofen (Tab) MOTRIN 200 MG Take 200 mg by mouth every 6 hours as needed.        .                 Medicines prescribed today were sent to:     [facility] [address] (S), [place] - [address]    [address] ([place]) [place] [address]    Phone: [phone] Fax: [phone]    Open 24 Hours?: No      Medication refill instructions:       If your prescription bottle indicates you have medication refills left, it is not necessary to call your provider’s office. Please contact your pharmacy and they will refill your medication.    If your prescription bottle indicates you do not have any refills left, you may request refills at any time through one of the following ways: The online [employer] system (except Urgent Care), by calling your provider’s office, or by asking your pharmacy to contact your provider’s office with a refill request. Medication refills are processed only during regular business hours and may not be available until the next business day. Your provider may request additional information or to have a follow-up visit with you prior to refilling your medication.   *Please Note: Medication refills are assigned a new Rx number when refilled electronically. Your pharmacy may indicate that no refills were authorized even though a new prescription for the same medication is available at the pharmacy. Please request the medicine by name with the pharmacy before contacting your provider for a refill.           [place] [facility] Status: Patient Declined

## 2017-12-19 ENCOUNTER — OFFICE VISIT (OUTPATIENT)
Dept: MEDICAL GROUP | Facility: MEDICAL CENTER | Age: 52
End: 2017-12-19
Payer: COMMERCIAL

## 2017-12-19 VITALS
DIASTOLIC BLOOD PRESSURE: 72 MMHG | SYSTOLIC BLOOD PRESSURE: 124 MMHG | HEIGHT: 65 IN | BODY MASS INDEX: 34.99 KG/M2 | HEART RATE: 77 BPM | WEIGHT: 210 LBS | OXYGEN SATURATION: 96 % | TEMPERATURE: 97.7 F

## 2017-12-19 DIAGNOSIS — R10.31 RIGHT LOWER QUADRANT ABDOMINAL PAIN: ICD-10-CM

## 2017-12-19 DIAGNOSIS — Z00.00 HEALTHCARE MAINTENANCE: ICD-10-CM

## 2017-12-19 DIAGNOSIS — E66.9 OBESITY (BMI 30-39.9): ICD-10-CM

## 2017-12-19 DIAGNOSIS — R73.03 PREDIABETES: ICD-10-CM

## 2017-12-19 DIAGNOSIS — M54.9 MUSCULOSKELETAL BACK PAIN: ICD-10-CM

## 2017-12-19 DIAGNOSIS — G47.33 OSA (OBSTRUCTIVE SLEEP APNEA): ICD-10-CM

## 2017-12-19 DIAGNOSIS — I49.3 PVC'S (PREMATURE VENTRICULAR CONTRACTIONS): ICD-10-CM

## 2017-12-19 PROCEDURE — 99214 OFFICE O/P EST MOD 30 MIN: CPT | Performed by: NURSE PRACTITIONER

## 2017-12-20 NOTE — ASSESSMENT & PLAN NOTE
3 days of RLQ discomfort, intermittently   Stopped 2 weeks ago, he is no longer concerned  No associated n/v/d, fever, hematuria

## 2017-12-20 NOTE — PROGRESS NOTES
"Subjective:     Chief Complaint   Patient presents with   • Back Pain    Follow up          Judah Olivera is a 52 y.o. male here today to follow up on:    Right lower quadrant abdominal pain  3 days of RLQ discomfort, intermittently   Stopped 2 weeks ago, he is no longer concerned  No associated n/v/d, fever, hematuria    Obesity (BMI 30-39.9)  No significant weight change in the last few months  No dietary change, \"traditional mexican diet\", tortillas  Walking quite a bit at work but no other exercise    Prediabetes  Fasting glucose in 2/2017 105  No diet change or weight loss since that time    JOSE (obstructive sleep apnea)  Using CPAP most of the time  Generally sleeping well    PVC's (premature ventricular contractions)  Evaluated by cardiology, holter showing 2000 PVCs  Denies any recent problems, no palpitations    Musculoskeletal back pain  Sore and stiff muscles in the mid back last few weeks  Worse at the end of a work day, sometimes having a hard time getting comfortable in bed  No h/o injury but does some occ lifting at work  No radiation of pain, no LE numbness, tingling, weakness  Has not tried any medications       Current medicines (including changes today)  Current Outpatient Prescriptions   Medication Sig Dispense Refill   • Acetaminophen (ARTHRITIS PAIN RELIEF PO) Take  by mouth.     • ibuprofen (MOTRIN) 200 MG Tab Take 200 mg by mouth every 6 hours as needed.       No current facility-administered medications for this visit.      He  has a past medical history of Back pain; Chickenpox; Hyperlipidemia; and Sleep apnea. He also has no past medical history of Asthma or Diabetes ([facility]).    ROS included above     Objective:     Blood pressure 124/72, pulse 77, temperature 36.5 °C (97.7 °F), height 1.651 m (5' 5\"), weight 95.3 kg (210 lb), SpO2 96 %. Body mass index is 34.95 kg/m².     Physical Exam:  General: Alert, oriented in no acute distress.  Eye contact is good, speech is normal, affect " calm  Lungs: clear to auscultation bilaterally, normal effort, no wheeze/ rhonchi/ rales.  CV: regular rate and rhythm, S1, S2, no murmur  Abdomen: soft, nontender, no distention  MS: no point tenderness over the spine, no deformity. Mild muscular tenderness in bilateral mid back. No hypertonicity. Normal gait  Ext: no edema, color normal, vascularity normal, temperature normal    Assessment and Plan:   The following treatment plan was discussed   1. Right lower quadrant abdominal pain  Completely resolved at this point, last episode 2 weeks ago. Advised to notify me for recurrence or further concerns   2. Obesity (BMI 30-39.9)  Counseled on diet, exercise   3. Prediabetes  Has not made any lifestyle changes recently. Repeat labs  COMP METABOLIC PANEL    HEMOGLOBIN A1C    LIPID PROFILE   4. JOSE (obstructive sleep apnea)  Continue CPAP   5. PVC's (premature ventricular contractions)  Episodes have decreased, not having concerns at this point.   6. Musculoskeletal back pain  Discussed regular stretching, NSAID as needed, heat, topical analgesic, printed exercises given.    7. Healthcare maintenance  Due for eye exam  REFERRAL TO OPHTHALMOLOGY       Followup: pending labs         Please note that this dictation was created using voice recognition software. I have worked with consultants from the vendor as well as technical experts from [employer] to optimize the interface. I have made every reasonable attempt to correct obvious errors, but I expect that there are errors of grammar and possibly content that I did not discover before finalizing the note.

## 2017-12-20 NOTE — ASSESSMENT & PLAN NOTE
"No significant weight change in the last few months  No dietary change, \"traditional mexican diet\", tortillas  Walking quite a bit at work but no other exercise  "

## 2017-12-20 NOTE — ASSESSMENT & PLAN NOTE
Sore and stiff muscles in the mid back last few weeks  Worse at the end of a work day, sometimes having a hard time getting comfortable in bed  No h/o injury but does some occ lifting at work  No radiation of pain, no LE numbness, tingling, weakness  Has not tried any medications

## 2018-01-26 ENCOUNTER — TELEPHONE (OUTPATIENT)
Dept: MEDICAL GROUP | Facility: MEDICAL CENTER | Age: 53
End: 2018-01-26

## 2018-01-26 LAB
ALBUMIN SERPL-MCNC: 4.2 G/DL (ref 3.5–5.5)
ALBUMIN/GLOB SERPL: 1.4 {RATIO} (ref 1.2–2.2)
ALP SERPL-CCNC: 68 IU/L (ref 39–117)
ALT SERPL-CCNC: 46 IU/L (ref 0–44)
AST SERPL-CCNC: 22 IU/L (ref 0–40)
BILIRUB SERPL-MCNC: 0.7 MG/DL (ref 0–1.2)
BUN SERPL-MCNC: 19 MG/DL (ref 6–24)
BUN/CREAT SERPL: 20 (ref 9–20)
CALCIUM SERPL-MCNC: 9.3 MG/DL (ref 8.7–10.2)
CHLORIDE SERPL-SCNC: 103 MMOL/L (ref 96–106)
CHOLEST SERPL-MCNC: 173 MG/DL (ref 100–199)
CO2 SERPL-SCNC: 24 MMOL/L (ref 18–29)
COMMENT 011824: ABNORMAL
CREAT SERPL-MCNC: 0.96 MG/DL (ref 0.76–1.27)
GFR SERPLBLD CREATININE-BSD FMLA CKD-EPI: 105 ML/MIN/1.73
GFR SERPLBLD CREATININE-BSD FMLA CKD-EPI: 91 ML/MIN/1.73
GLOBULIN SER CALC-MCNC: 3.1 G/DL (ref 1.5–4.5)
GLUCOSE SERPL-MCNC: 132 MG/DL (ref 65–99)
HBA1C MFR BLD: 6.5 % (ref 4.8–5.6)
HDLC SERPL-MCNC: 43 MG/DL
LDLC SERPL CALC-MCNC: 105 MG/DL (ref 0–99)
POTASSIUM SERPL-SCNC: 3.9 MMOL/L (ref 3.5–5.2)
PROT SERPL-MCNC: 7.3 G/DL (ref 6–8.5)
SODIUM SERPL-SCNC: 141 MMOL/L (ref 134–144)
TRIGL SERPL-MCNC: 125 MG/DL (ref 0–149)
VLDLC SERPL CALC-MCNC: 25 MG/DL (ref 5–40)

## 2018-01-26 NOTE — TELEPHONE ENCOUNTER
----- Message from INDIO Keen sent at 1/26/2018 10:09 AM PST -----  Please inform patient labs show his glucose has increased 232 from a prior of 105. He now has mild diabetes. I would recommend that we schedule him for a diabetes education class that he can work on diet management, please let me know if he is agreeable to referral. He needs to work on weight loss, reducing carbohydrates in his diet and eating more vegetables. Please schedule follow-up in 3 months

## 2018-01-26 NOTE — LETTER
January 26, 2018        Judah Olivera  [address]  [place] [address]        Dear Judah:    Our office has been trying to contact you regarding your lab results.     Your labs show your glucose has increased 232 from a prior of 105. You now have mild diabetes. I would recommend that we schedule you for a diabetes education class that you can work on diet management, please let me know if you are agreeable to referral. You need to work on weight loss, reducing carbohydrates in your diet and eating more vegetables. Please schedule follow-up in 3 months     If you have any questions or concerns, please don't hesitate to call.        Sincerely,        JAIMIE Keen.    Electronically Signed

## 2018-03-16 ENCOUNTER — TELEPHONE (OUTPATIENT)
Dept: MEDICAL GROUP | Facility: MEDICAL CENTER | Age: 53
End: 2018-03-16

## 2018-03-16 NOTE — TELEPHONE ENCOUNTER
Phone Number Called: [phone] (home)     Message: Called pt to inform him his last annual visit was in Feb of 2017 and to inform him his insurance may not accept that as an appropriate date. The form is completed. Left message for pt to call back at [phone].     Left Message for patient to call back: yes

## 2018-03-19 NOTE — TELEPHONE ENCOUNTER
Phone Number Called: [phone] (home)     Message: Left message for pt to call back at [phone].     Left Message for patient to call back: yes

## 2018-05-04 ENCOUNTER — OFFICE VISIT (OUTPATIENT)
Dept: URGENT CARE | Facility: CLINIC | Age: 53
End: 2018-05-04
Payer: COMMERCIAL

## 2018-05-04 VITALS
HEART RATE: 88 BPM | BODY MASS INDEX: 34.66 KG/M2 | DIASTOLIC BLOOD PRESSURE: 76 MMHG | SYSTOLIC BLOOD PRESSURE: 128 MMHG | RESPIRATION RATE: 16 BRPM | WEIGHT: 208 LBS | HEIGHT: 65 IN | OXYGEN SATURATION: 95 % | TEMPERATURE: 98.8 F

## 2018-05-04 DIAGNOSIS — J22 LRTI (LOWER RESPIRATORY TRACT INFECTION): ICD-10-CM

## 2018-05-04 LAB
APPEARANCE UR: CLEAR
BILIRUB UR STRIP-MCNC: NEGATIVE MG/DL
COLOR UR AUTO: YELLOW
FLUAV+FLUBV AG SPEC QL IA: NEGATIVE
GLUCOSE UR STRIP.AUTO-MCNC: NEGATIVE MG/DL
INT CON NEG: NEGATIVE
INT CON NEG: NEGATIVE
INT CON POS: POSITIVE
INT CON POS: POSITIVE
KETONES UR STRIP.AUTO-MCNC: NEGATIVE MG/DL
LEUKOCYTE ESTERASE UR QL STRIP.AUTO: NEGATIVE
NITRITE UR QL STRIP.AUTO: NEGATIVE
PH UR STRIP.AUTO: 5 [PH] (ref 5–8)
PROT UR QL STRIP: NEGATIVE MG/DL
RBC UR QL AUTO: NEGATIVE
S PYO AG THROAT QL: NEGATIVE
SP GR UR STRIP.AUTO: 1.02
UROBILINOGEN UR STRIP-MCNC: NEGATIVE MG/DL

## 2018-05-04 PROCEDURE — 87880 STREP A ASSAY W/OPTIC: CPT | Performed by: FAMILY MEDICINE

## 2018-05-04 PROCEDURE — 81002 URINALYSIS NONAUTO W/O SCOPE: CPT | Performed by: FAMILY MEDICINE

## 2018-05-04 PROCEDURE — 87804 INFLUENZA ASSAY W/OPTIC: CPT | Performed by: FAMILY MEDICINE

## 2018-05-04 PROCEDURE — 99213 OFFICE O/P EST LOW 20 MIN: CPT | Performed by: FAMILY MEDICINE

## 2018-05-04 NOTE — LETTER
May 4, 2018         Patient: Judah Olivera   YOB: 1965   Date of Visit: 5/4/2018           To Whom it May Concern:    Judah Olivera was seen in my clinic on 5/4/2018. He may return to work on 5/7/2018 unless improved prior..    If you have any questions or concerns, please don't hesitate to call.        Sincerely,           Thien García M.D.  Electronically Signed

## 2018-05-17 ASSESSMENT — ENCOUNTER SYMPTOMS
VISUAL CHANGE: 0
SORE THROAT: 0
COUGH: 0
NAUSEA: 0
FEVER: 0
HEADACHES: 0
FATIGUE: 1
VOMITING: 0
CHILLS: 0
DIZZINESS: 1
MYALGIAS: 0
ABDOMINAL PAIN: 0

## 2018-05-17 NOTE — PROGRESS NOTES
"Subjective:   Judah Olivera is a 52 y.o. male who presents for Dizziness (fatigue, dizziness, hungry, x today, not feeling right. )        Dizziness   This is a new problem. The current episode started today. The problem occurs rarely. The problem has been resolved. Associated symptoms include fatigue. Pertinent negatives include no abdominal pain, chills, congestion, coughing, fever, headaches, myalgias, nausea, sore throat, urinary symptoms, visual change or vomiting.     Review of Systems   Constitutional: Positive for fatigue. Negative for chills and fever.   HENT: Negative for congestion and sore throat.    Respiratory: Negative for cough.    Gastrointestinal: Negative for abdominal pain, nausea and vomiting.   Musculoskeletal: Negative for myalgias.   Neurological: Positive for dizziness. Negative for headaches.     No Known Allergies   Objective:   /76   Pulse 88   Temp 37.1 °C (98.8 °F)   Resp 16   Ht 1.651 m (5' 5\")   Wt 94.3 kg (208 lb)   SpO2 95%   BMI 34.61 kg/m²   Physical Exam   Constitutional: He is oriented to person, place, and time. He appears well-developed and well-nourished. No distress.   HENT:   Head: Normocephalic and atraumatic.   Eyes: Conjunctivae and EOM are normal. Pupils are equal, round, and reactive to light.   Cardiovascular: Normal rate and regular rhythm.    No murmur heard.  Pulmonary/Chest: Effort normal and breath sounds normal. No respiratory distress.   Abdominal: Soft. He exhibits no distension. There is no tenderness.   Neurological: He is alert and oriented to person, place, and time. He has normal reflexes. No sensory deficit.   Skin: Skin is warm and dry.   Psychiatric: He has a normal mood and affect.         Assessment/Plan:   Assessment    1. LRTI (lower respiratory tract infection)  - POCT Rapid Strep A  - POCT Influenza A/B  - POCT Urinalysis  Differential diagnosis, natural history, supportive care, and indications for immediate follow-up discussed.  No " defining features suggestive of specific area. Patient to follow up with PCP ASAP. Return to clinic as needed if new symptoms arise. Likely viral in etiology with progress over the next few days with defining features.

## 2018-05-30 ENCOUNTER — OFFICE VISIT (OUTPATIENT)
Dept: MEDICAL GROUP | Facility: MEDICAL CENTER | Age: 53
End: 2018-05-30
Payer: COMMERCIAL

## 2018-05-30 VITALS
TEMPERATURE: 98.4 F | SYSTOLIC BLOOD PRESSURE: 120 MMHG | HEIGHT: 65 IN | WEIGHT: 210 LBS | DIASTOLIC BLOOD PRESSURE: 82 MMHG | RESPIRATION RATE: 16 BRPM | HEART RATE: 76 BPM | OXYGEN SATURATION: 94 % | BODY MASS INDEX: 34.99 KG/M2

## 2018-05-30 DIAGNOSIS — E66.9 OBESITY (BMI 30-39.9): ICD-10-CM

## 2018-05-30 DIAGNOSIS — R73.03 PREDIABETES: ICD-10-CM

## 2018-05-30 DIAGNOSIS — D22.9 MULTIPLE BENIGN NEVI: ICD-10-CM

## 2018-05-30 PROBLEM — R10.31 RIGHT LOWER QUADRANT ABDOMINAL PAIN: Status: RESOLVED | Noted: 2017-12-19 | Resolved: 2018-05-30

## 2018-05-30 PROCEDURE — 99214 OFFICE O/P EST MOD 30 MIN: CPT | Performed by: NURSE PRACTITIONER

## 2018-05-30 ASSESSMENT — PATIENT HEALTH QUESTIONNAIRE - PHQ9: CLINICAL INTERPRETATION OF PHQ2 SCORE: 0

## 2018-05-31 NOTE — PROGRESS NOTES
"Subjective:     Chief Complaint   Patient presents with   • Follow-Up     From [facility] care 3 weeks ago/diziness/fever     Judah Olivera is a 52 y.o. male here today to follow up on chronic issues and discuss recent urgent care visit. Presented to the urgent care at the beginning of May with dizziness and fever. He was out of work for 3 days then began to feel better. Now feeling fine.    Prediabetes  Last a1c 6.5  Has cut back on soda, no other significant diet change  Active during the day but no planned exercise  No polyuria, polydipsia, numbness or tingling in ext    Obesity (BMI 30-39.9)  Weight stable  Typically eating once daily- beans, meat, eggs  Has a gym membership but not going  Planning to start working on weight loss    Multiple benign nevi  Several raised moles on the head/ neck that become irritated or are easily scratched. Interested in seeing derm for removal       Current medicines (including changes today)  Current Outpatient Prescriptions   Medication Sig Dispense Refill   • Acetaminophen (ARTHRITIS PAIN RELIEF PO) Take  by mouth.     • ibuprofen (MOTRIN) 200 MG Tab Take 200 mg by mouth every 6 hours as needed.       No current facility-administered medications for this visit.      He  has a past medical history of Back pain; Chickenpox; Hyperlipidemia; and Sleep apnea. He also has no past medical history of Asthma or Diabetes (HCC).    ROS included above     Objective:     Blood pressure 120/82, pulse 76, temperature 36.9 °C (98.4 °F), resp. rate 16, height 1.651 m (5' 5\"), weight 95.3 kg (210 lb), SpO2 94 %. Body mass index is 34.95 kg/m².     Physical Exam:  General: Alert, oriented in no acute distress.  Eye contact is good, speech is normal, affect calm  HEENT: large raised nevi on the L scalp. Similar lesion on the R neck at the hairline. Oral mucosa pink moist, no lesions. Nares clear. TMs gray with good landmarks bilaterally. No lymphadenopathy.  Lungs: clear to auscultation bilaterally, " normal effort, no wheeze/ rhonchi/ rales.  CV: regular rate and rhythm, S1, S2, no murmur  Abdomen: soft, nontender  Ext: no edema, color normal, vascularity normal, temperature normal    Assessment and Plan:   The following treatment plan was discussed   1. Prediabetes  Last a1c 6.5. Has not made any significant lifestyle change at this point but states he plans to begin working on weight loss. Counseled on dietary and exercise recommendations. Labs in 2 months  HEMOGLOBIN A1C    COMP METABOLIC PANEL   2. Obesity (BMI 30-39.9)  Enc to work on weight loss   3. Multiple benign nevi  Several large nevi that become irritated or easily scratched  REFERRAL TO DERMATOLOGY       Followup: labs in July          Please note that this dictation was created using voice recognition software. I have worked with consultants from the vendor as well as technical experts from [employer] to optimize the interface. I have made every reasonable attempt to correct obvious errors, but I expect that there are errors of grammar and possibly content that I did not discover before finalizing the note.

## 2018-05-31 NOTE — ASSESSMENT & PLAN NOTE
Last a1c 6.5  Has cut back on soda, no other significant diet change  Active during the day but no planned exercise  No polyuria, polydipsia, numbness or tingling in ext

## 2018-05-31 NOTE — ASSESSMENT & PLAN NOTE
Several raised moles on the head/ neck that become irritated or are easily scratched. Interested in seeing derm for removal

## 2018-05-31 NOTE — ASSESSMENT & PLAN NOTE
Weight stable  Typically eating once daily- beans, meat, eggs  Has a gym membership but not going  Planning to start working on weight loss

## 2018-07-20 LAB
ALBUMIN SERPL-MCNC: 4.3 G/DL (ref 3.5–5.5)
ALBUMIN/GLOB SERPL: 1.7 {RATIO} (ref 1.2–2.2)
ALP SERPL-CCNC: 62 IU/L (ref 39–117)
ALT SERPL-CCNC: 60 IU/L (ref 0–44)
AST SERPL-CCNC: 37 IU/L (ref 0–40)
BILIRUB SERPL-MCNC: 0.7 MG/DL (ref 0–1.2)
BUN SERPL-MCNC: 15 MG/DL (ref 6–24)
BUN/CREAT SERPL: 19 (ref 9–20)
CALCIUM SERPL-MCNC: 9 MG/DL (ref 8.7–10.2)
CHLORIDE SERPL-SCNC: 103 MMOL/L (ref 96–106)
CO2 SERPL-SCNC: 20 MMOL/L (ref 20–29)
CREAT SERPL-MCNC: 0.79 MG/DL (ref 0.76–1.27)
GLOBULIN SER CALC-MCNC: 2.6 G/DL (ref 1.5–4.5)
GLUCOSE SERPL-MCNC: 110 MG/DL (ref 65–99)
HBA1C MFR BLD: 6.5 % (ref 4.8–5.6)
IF AFRICAN AMERICAN  100797: 119 ML/MIN/1.73
IF NON AFRICAN AMER 100791: 103 ML/MIN/1.73
POTASSIUM SERPL-SCNC: 4 MMOL/L (ref 3.5–5.2)
PROT SERPL-MCNC: 6.9 G/DL (ref 6–8.5)
SODIUM SERPL-SCNC: 141 MMOL/L (ref 134–144)

## 2018-07-23 ENCOUNTER — TELEPHONE (OUTPATIENT)
Dept: MEDICAL GROUP | Facility: MEDICAL CENTER | Age: 53
End: 2018-07-23

## 2018-07-23 NOTE — TELEPHONE ENCOUNTER
----- Message from INDIO Keen sent at 7/23/2018 12:36 PM PDT -----  Please inform patient labs are stable, A1c is the same as last time at 6.5 which is diabetic but low well-controlled.  Continue to work on diet and exercise

## 2018-09-25 ENCOUNTER — OFFICE VISIT (OUTPATIENT)
Dept: URGENT CARE | Facility: CLINIC | Age: 53
End: 2018-09-25
Payer: COMMERCIAL

## 2018-09-25 VITALS
DIASTOLIC BLOOD PRESSURE: 82 MMHG | TEMPERATURE: 98.6 F | OXYGEN SATURATION: 97 % | HEART RATE: 78 BPM | RESPIRATION RATE: 16 BRPM | BODY MASS INDEX: 35.82 KG/M2 | SYSTOLIC BLOOD PRESSURE: 134 MMHG | WEIGHT: 215 LBS | HEIGHT: 65 IN

## 2018-09-25 DIAGNOSIS — M62.838 CERVICAL PARASPINAL MUSCLE SPASM: ICD-10-CM

## 2018-09-25 PROCEDURE — 99214 OFFICE O/P EST MOD 30 MIN: CPT | Performed by: NURSE PRACTITIONER

## 2018-09-25 RX ORDER — MELOXICAM 7.5 MG/1
7.5 TABLET ORAL DAILY
Qty: 30 TAB | Refills: 0 | Status: SHIPPED | OUTPATIENT
Start: 2018-09-25 | End: 2018-12-12

## 2018-09-25 RX ORDER — METHOCARBAMOL 750 MG/1
750 TABLET, FILM COATED ORAL 3 TIMES DAILY
Qty: 30 TAB | Refills: 0 | Status: SHIPPED | OUTPATIENT
Start: 2018-09-25 | End: 2018-12-12

## 2018-09-25 RX ORDER — DEXAMETHASONE SODIUM PHOSPHATE 10 MG/ML
8 INJECTION INTRAMUSCULAR; INTRAVENOUS ONCE
Status: COMPLETED | OUTPATIENT
Start: 2018-09-25 | End: 2018-09-25

## 2018-09-25 RX ADMIN — DEXAMETHASONE SODIUM PHOSPHATE 8 MG: 10 INJECTION INTRAMUSCULAR; INTRAVENOUS at 18:29

## 2018-09-29 ASSESSMENT — ENCOUNTER SYMPTOMS
SENSORY CHANGE: 1
BACK PAIN: 1
TINGLING: 1
BLURRED VISION: 0
HEADACHES: 0
DOUBLE VISION: 0
PHOTOPHOBIA: 0
MYALGIAS: 1
ORTHOPNEA: 0
DIZZINESS: 0
SORE THROAT: 0
NECK PAIN: 1
PALPITATIONS: 0
WEAKNESS: 0
SHORTNESS OF BREATH: 0
FEVER: 0
CHILLS: 0

## 2018-09-30 NOTE — PROGRESS NOTES
Subjective:      Judah Olivera is a 53 y.o. male who presents with Back Pain (x 2 week / neck pain 2 weels )            HPI  Judah is here for acute neck pain/upper back pain x 2 days. Denies trauma, injury or fall. No previous injury to neck. Denies CP, SOB. Intermittent numbness/tingling in left upper extremities. Performs repetitive motions at work. No NSAID use. C/o stiff neck.    PMH:  has a past medical history of Back pain; Chickenpox; Hyperlipidemia; and Sleep apnea. He also has no past medical history of Asthma or Diabetes ([facility]).  MEDS:   Current Outpatient Prescriptions:   •  meloxicam (MOBIC) 7.5 MG Tab, Take 1 Tab by mouth every day., Disp: 30 Tab, Rfl: 0  •  methocarbamol (ROBAXIN) 750 MG Tab, Take 1 Tab by mouth 3 times a day. Causes drowsiness, do not drive or work while using., Disp: 30 Tab, Rfl: 0  •  Acetaminophen (ARTHRITIS PAIN RELIEF PO), Take  by mouth., Disp: , Rfl:   •  ibuprofen (MOTRIN) 200 MG Tab, Take 200 mg by mouth every 6 hours as needed., Disp: , Rfl:   ALLERGIES: No Known Allergies  SURGHX:   Past Surgical History:   Procedure Laterality Date   • TONSILLECTOMY       SOCHX:  reports that he has quit smoking. His smoking use included Cigarettes. He has a 3.75 pack-year smoking history. He has never used smokeless tobacco. He reports that he drinks alcohol. He reports that he does not use drugs.  FH: Family history was reviewed, no pertinent findings to report    Review of Systems   Constitutional: Negative for chills, fever and malaise/fatigue.   HENT: Negative for sore throat.    Eyes: Negative for blurred vision, double vision and photophobia.   Respiratory: Negative for shortness of breath.    Cardiovascular: Negative for chest pain, palpitations and orthopnea.   Musculoskeletal: Positive for back pain, myalgias and neck pain.   Skin: Negative for itching and rash.   Neurological: Positive for tingling and sensory change. Negative for dizziness, weakness and headaches.   All other  "systems reviewed and are negative.         Objective:     /82 (BP Location: Left arm, Patient Position: Sitting, BP Cuff Size: Large adult)   Pulse 78   Temp 37 °C (98.6 °F)   Resp 16   Ht 1.651 m (5' 5\")   Wt 97.5 kg (215 lb)   SpO2 97%   BMI 35.78 kg/m²      Physical Exam   Constitutional: He is oriented to person, place, and time. Vital signs are normal. He appears well-developed and well-nourished. He is active and cooperative.  Non-toxic appearance. He does not have a sickly appearance. He does not appear ill. No distress.   HENT:   Head: Normocephalic.   Eyes: Pupils are equal, round, and reactive to light. Conjunctivae and EOM are normal.   Neck: Neck supple. Muscular tenderness present. No spinous process tenderness present. No neck rigidity. Decreased range of motion present. No edema and no erythema present.   Cardiovascular: Normal rate and regular rhythm.    Pulmonary/Chest: Effort normal and breath sounds normal. No respiratory distress. He has no wheezes. He has no rales.   Musculoskeletal: He exhibits tenderness. He exhibits no edema or deformity.        Cervical back: He exhibits decreased range of motion, tenderness, pain and spasm. He exhibits no bony tenderness, no swelling, no edema and no deformity.        Back:    Neurological: He is alert and oriented to person, place, and time. He has normal strength. No cranial nerve deficit or sensory deficit. Coordination and gait normal. GCS eye subscore is 4. GCS verbal subscore is 5. GCS motor subscore is 6.   Skin: Skin is warm and dry. No rash noted. No erythema.   Vitals reviewed.              Assessment/Plan:     1. Cervical paraspinal muscle spasm    - meloxicam (MOBIC) 7.5 MG Tab; Take 1 Tab by mouth every day.  Dispense: 30 Tab; Refill: 0  - methocarbamol (ROBAXIN) 750 MG Tab; Take 1 Tab by mouth 3 times a day. Causes drowsiness, do not drive or work while using.  Dispense: 30 Tab; Refill: 0  - dexamethasone (DECADRON) injection " (check route below) 8 mg; 0.8 mL by Intramuscular route Once.  - REFERRAL TO SPORTS MEDICINE    May use cool compresses for swelling and warm compresses for muscle stiffness   May perform muscle stretches as tolerated after warm compresses to maintain mobility, avoid abdominal crunches  May continue muscle relaxant prn when at home only   May apply topical analgesics prn  Perform proper body mechanics with lifting, twisting, bending and reaching. Ask for assistance with heay objects  Monitor for bowel/urination problems, numbness/tingling in lower extremities, decreased ROM with ambulation difficulty- need re-evaluation  Follow up with Sports Med

## 2018-10-03 ENCOUNTER — HOSPITAL ENCOUNTER (OUTPATIENT)
Dept: RADIOLOGY | Facility: MEDICAL CENTER | Age: 53
End: 2018-10-03
Attending: FAMILY MEDICINE
Payer: COMMERCIAL

## 2018-10-03 ENCOUNTER — OFFICE VISIT (OUTPATIENT)
Dept: MEDICAL GROUP | Facility: CLINIC | Age: 53
End: 2018-10-03
Payer: COMMERCIAL

## 2018-10-03 VITALS
DIASTOLIC BLOOD PRESSURE: 84 MMHG | TEMPERATURE: 99 F | HEART RATE: 82 BPM | SYSTOLIC BLOOD PRESSURE: 140 MMHG | BODY MASS INDEX: 35.51 KG/M2 | OXYGEN SATURATION: 95 % | WEIGHT: 208 LBS | RESPIRATION RATE: 16 BRPM | HEIGHT: 64 IN

## 2018-10-03 DIAGNOSIS — M54.12 CERVICAL RADICULOPATHY AT C7: ICD-10-CM

## 2018-10-03 PROCEDURE — 99213 OFFICE O/P EST LOW 20 MIN: CPT | Performed by: FAMILY MEDICINE

## 2018-10-03 PROCEDURE — 72040 X-RAY EXAM NECK SPINE 2-3 VW: CPT

## 2018-10-03 RX ORDER — PREDNISONE 20 MG/1
TABLET ORAL
Qty: 11 TAB | Refills: 0 | Status: SHIPPED | OUTPATIENT
Start: 2018-10-03 | End: 2018-12-12

## 2018-10-03 ASSESSMENT — PAIN SCALES - GENERAL: PAINLEVEL: 7=MODERATE-SEVERE PAIN

## 2018-10-03 ASSESSMENT — ENCOUNTER SYMPTOMS
TINGLING: 1
SHORTNESS OF BREATH: 0
FOCAL WEAKNESS: 0
NECK PAIN: 1
SORE THROAT: 0
FEVER: 0

## 2018-10-03 NOTE — PROGRESS NOTES
Subjective:     Judah Olivera is a 53 y.o. male who presents for Neck Pain (neck pain radiating down left arm x3 weeks)      HPI  Pt presents for evaluation of neck pain radiating down left arm   Pain has been going on for 3 weeks   Pain started suddenly and very mild.  Continued to worsen over the next 2 weeks   Went to urgent care 1 week ago   Was given injection with dexamethasone and was also given meloxicam and Robaxin   Feels that the medications have helped a little bit but not enough   Current pain shoots from the neck down into the left hand and fingers  Pain is a sharp and constant pain  Pain becomes worse when moving the neck  Has no history of neck injuries or pain like this before  No history of carpal tunnel syndrome  No weakness in his left upper extremity, however the pain makes it difficult to fully use his left upper extremity  Does have numbness and tingling in fingertips     Review of Systems   Constitutional: Negative for fever.   HENT: Negative for sore throat.    Respiratory: Negative for shortness of breath.    Cardiovascular: Negative for chest pain.   Musculoskeletal: Positive for neck pain.   Skin: Negative for rash.   Neurological: Positive for tingling. Negative for focal weakness.     PMH:  has a past medical history of Back pain; Chickenpox; Hyperlipidemia; and Sleep apnea. He also has no past medical history of Asthma or Diabetes ([facility]).  MEDS:   Current Outpatient Prescriptions:   •  meloxicam (MOBIC) 7.5 MG Tab, Take 1 Tab by mouth every day., Disp: 30 Tab, Rfl: 0  •  methocarbamol (ROBAXIN) 750 MG Tab, Take 1 Tab by mouth 3 times a day. Causes drowsiness, do not drive or work while using., Disp: 30 Tab, Rfl: 0  •  Acetaminophen (ARTHRITIS PAIN RELIEF PO), Take  by mouth., Disp: , Rfl:   •  ibuprofen (MOTRIN) 200 MG Tab, Take 200 mg by mouth every 6 hours as needed., Disp: , Rfl:   ALLERGIES: No Known Allergies  SURGHX:   Past Surgical History:   Procedure Laterality Date   •  "TONSILLECTOMY       SOCHX:  reports that he has quit smoking. His smoking use included Cigarettes. He has a 3.75 pack-year smoking history. He has never used smokeless tobacco. He reports that he drinks alcohol. He reports that he does not use drugs.  FH: Family history was reviewed, not contributing to current neck pain      Objective:   /84 (BP Location: Left arm, Patient Position: Sitting, BP Cuff Size: Adult)   Pulse 82   Temp 37.2 °C (99 °F) (Temporal)   Resp 16   Ht 1.626 m (5' 4\")   Wt 94.3 kg (208 lb)   SpO2 95%   BMI 35.70 kg/m²     Physical Exam   Constitutional: He is oriented to person, place, and time. He appears well-developed and well-nourished. No distress.   HENT:   Head: Normocephalic and atraumatic.   Neurological: He is alert and oriented to person, place, and time.   Skin: Skin is warm and dry. No rash noted. He is not diaphoretic.   Psychiatric: He has a normal mood and affect. His behavior is normal. Judgment and thought content normal.     Neck  General: No asymmetry, bruising, or erythema appreciated  ROM: FROM flex/extend/lateral bend/lateral rotation  Palpation: NoTTP of spinous processes, no step-offs appreciated, +tightness and mild TTP of left sided paraspinal muscles, no significant scoliosis appreciated  Special tests: +Spurling's on left  Strength: 5/5 strength throughout left upper extremity   Neuro: Sensation intact and equal bilaterally in UE's, full motor function in left upper extremity   Vascular: Radial pulse 2+     Assessment/Plan:   Assessment    1. Cervical radiculopathy at C7  Patient is a 53-year-old male with radiating pain down into hand from neck consistent with a radiculopathy most likely at the C7 level.  He made some improvements with steroid injection and anti-inflammatory medications at urgent care 1 week ago.  Patient never had any neck imaging done, and will order neck x-ray today to rule out fracture or significant spondylolisthesis.  After " obtaining x-ray, patient will start taking prednisone tablets and plan to follow-up in office in the next 1-2 weeks.  If not greatly improved at next visit, would consider physical therapy referral.    - DX-CERVICAL SPINE-2 OR 3 VIEWS; Future  - predniSONE (DELTASONE) 20 MG Tab; Take 2 tabs per day x 3 days, then take 1 tab per day x 3 days, then take 1/2 tab per day x 3 days  Dispense: 11 Tab; Refill: 0    F/U 1-2 weeks

## 2018-11-07 ENCOUNTER — OFFICE VISIT (OUTPATIENT)
Dept: MEDICAL GROUP | Facility: CLINIC | Age: 53
End: 2018-11-07
Payer: COMMERCIAL

## 2018-11-07 VITALS
WEIGHT: 208 LBS | HEIGHT: 64 IN | RESPIRATION RATE: 18 BRPM | TEMPERATURE: 99 F | HEART RATE: 95 BPM | SYSTOLIC BLOOD PRESSURE: 128 MMHG | BODY MASS INDEX: 35.51 KG/M2 | DIASTOLIC BLOOD PRESSURE: 82 MMHG | OXYGEN SATURATION: 95 %

## 2018-11-07 DIAGNOSIS — M54.12 CERVICAL RADICULOPATHY AT C7: ICD-10-CM

## 2018-11-07 PROCEDURE — 99213 OFFICE O/P EST LOW 20 MIN: CPT | Performed by: FAMILY MEDICINE

## 2018-11-07 NOTE — PROGRESS NOTES
"Subjective:     Judah Olivera is a 53 y.o. male who presents for Neck Pain (F/V Neck pain )    HPI  Pt presents for follow-up of neck pain   Pt with neck pain radiating to the left hand and arm   Initially took a course of steroids and felt it helped a little   Having some tingling in 3rd and 4th fingertips   Feels that his  in left hand is a little decreased   No radiating pain in the arm or hand   Still having significant pain in neck   Pain is a \"stretching\" pain   Pain sometimes keeps him up at night     Has not attended PT   Has not had injections in neck in the past   No hx of surgery on neck     Review of Systems   Constitutional: Negative for fever.   HENT: Negative for sore throat.    Respiratory: Negative for shortness of breath.    Cardiovascular: Negative for chest pain.   Skin: Negative for rash.   Neurological: Negative for focal weakness.     PMH:  has a past medical history of Back pain; Chickenpox; Hyperlipidemia; and Sleep apnea. He also has no past medical history of Asthma or Diabetes ([facility]).  MEDS:   Current Outpatient Prescriptions:   •  predniSONE (DELTASONE) 20 MG Tab, Take 2 tabs per day x 3 days, then take 1 tab per day x 3 days, then take 1/2 tab per day x 3 days, Disp: 11 Tab, Rfl: 0  •  meloxicam (MOBIC) 7.5 MG Tab, Take 1 Tab by mouth every day. (Patient not taking: Reported on 11/7/2018), Disp: 30 Tab, Rfl: 0  •  methocarbamol (ROBAXIN) 750 MG Tab, Take 1 Tab by mouth 3 times a day. Causes drowsiness, do not drive or work while using. (Patient not taking: Reported on 11/7/2018), Disp: 30 Tab, Rfl: 0  •  Acetaminophen (ARTHRITIS PAIN RELIEF PO), Take  by mouth., Disp: , Rfl:   •  ibuprofen (MOTRIN) 200 MG Tab, Take 200 mg by mouth every 6 hours as needed., Disp: , Rfl:   ALLERGIES: No Known Allergies  SURGHX:   Past Surgical History:   Procedure Laterality Date   • TONSILLECTOMY          Objective:   /82 (BP Location: Left arm, Patient Position: Sitting, BP Cuff Size: Adult)   " "Pulse 95   Temp 37.2 °C (99 °F) (Temporal)   Resp 18   Ht 1.626 m (5' 4\")   Wt 94.3 kg (208 lb)   SpO2 95%   BMI 35.70 kg/m²     Physical Exam   Constitutional: He is oriented to person, place, and time. He appears well-developed and well-nourished. No distress.   HENT:   Head: Normocephalic and atraumatic.   Musculoskeletal:   Neck  General: No asymmetry, bruising, or erythema appreciated  ROM: FROM flex/extend/lateral bend/lateral rotation  Palpation: NoTTP of spinous processes, no step-offs appreciated, + TTP of paraspinal muscles, no significant scoliosis appreciated  Special tests: Neg Spurling's  Neuro: Sensation intact and equal bilaterally in UE's  Vascular: Radial pulse 2+    Neurological: He is alert and oriented to person, place, and time.   Skin: Skin is warm and dry. No rash noted. He is not diaphoretic.   Psychiatric: He has a normal mood and affect. His behavior is normal. Judgment and thought content normal.     Assessment/Plan:   Assessment    1. Cervical radiculopathy at C7  Patient is a 53-year-old male with cervical radiculopathy.  Already underwent a course of steroids with some improvements, but still having symptoms.  Spurling's no longer positive, but still has significant pain in cervical muscles.  Advised that physical therapy is next step.  Will plan to follow-up after 2-3 weeks of physical therapy.  If not making adequate improvements at physical therapy, would consider MRI versus referral to spine surgery.  - REFERRAL TO PHYSICAL THERAPY Reason for Therapy: Eval/Treat/Report    F/U 4-6 weeks     "

## 2018-11-09 ASSESSMENT — ENCOUNTER SYMPTOMS
SORE THROAT: 0
FEVER: 0
FOCAL WEAKNESS: 0
SHORTNESS OF BREATH: 0

## 2018-11-19 ENCOUNTER — APPOINTMENT (OUTPATIENT)
Dept: PHYSICAL THERAPY | Facility: MEDICAL CENTER | Age: 53
End: 2018-11-19
Attending: FAMILY MEDICINE
Payer: COMMERCIAL

## 2018-12-12 ENCOUNTER — OFFICE VISIT (OUTPATIENT)
Dept: MEDICAL GROUP | Facility: MEDICAL CENTER | Age: 53
End: 2018-12-12
Payer: COMMERCIAL

## 2018-12-12 VITALS
OXYGEN SATURATION: 98 % | WEIGHT: 208 LBS | DIASTOLIC BLOOD PRESSURE: 70 MMHG | SYSTOLIC BLOOD PRESSURE: 126 MMHG | HEART RATE: 77 BPM | HEIGHT: 64 IN | TEMPERATURE: 97.4 F | RESPIRATION RATE: 16 BRPM | BODY MASS INDEX: 35.51 KG/M2

## 2018-12-12 DIAGNOSIS — Z23 INFLUENZA VACCINE NEEDED: ICD-10-CM

## 2018-12-12 DIAGNOSIS — Z00.00 ANNUAL PHYSICAL EXAM: ICD-10-CM

## 2018-12-12 DIAGNOSIS — E11.9 TYPE 2 DIABETES, DIET CONTROLLED (HCC): ICD-10-CM

## 2018-12-12 DIAGNOSIS — G47.33 OSA (OBSTRUCTIVE SLEEP APNEA): ICD-10-CM

## 2018-12-12 DIAGNOSIS — Z12.5 SCREENING FOR PROSTATE CANCER: ICD-10-CM

## 2018-12-12 DIAGNOSIS — E66.9 OBESITY (BMI 30-39.9): ICD-10-CM

## 2018-12-12 PROCEDURE — 90471 IMMUNIZATION ADMIN: CPT | Performed by: NURSE PRACTITIONER

## 2018-12-12 PROCEDURE — 99396 PREV VISIT EST AGE 40-64: CPT | Mod: 25 | Performed by: NURSE PRACTITIONER

## 2018-12-12 PROCEDURE — 90686 IIV4 VACC NO PRSV 0.5 ML IM: CPT | Performed by: NURSE PRACTITIONER

## 2018-12-13 NOTE — ASSESSMENT & PLAN NOTE
Last A1c 6.5.  Admits that his diet has been poor, typically eats 1 meal a day, generally high in carbohydrates.  Not exercising.  No recent weight change.  Denies polyuria, polydipsia

## 2018-12-13 NOTE — PROGRESS NOTES
Chief Complaint   Patient presents with   • Annual Exam     SMALL SORE THROAT / NO CONCERNS     Judah Brooks is a 53 y.o. male here for annual well exam.  He states that he has had a mild sore throat for the last 2-3 days.  Otherwise is feeling well.  Denies congestion, cough, otalgia, fever.  We discussed:    JOSE (obstructive sleep apnea)  Has not been very good about using CPAP recently    Type 2 diabetes, diet controlled ([facility])  Last A1c 6.5.  Admits that his diet has been poor, typically eats 1 meal a day, generally high in carbohydrates.  Not exercising.  No recent weight change.  Denies polyuria, polydipsia    Current medicines (including changes today)  Current Outpatient Prescriptions   Medication Sig Dispense Refill   • Acetaminophen (ARTHRITIS PAIN RELIEF PO) Take  by mouth.     • ibuprofen (MOTRIN) 200 MG Tab Take 200 mg by mouth every 6 hours as needed.       No current facility-administered medications for this visit.      He  has a past medical history of Back pain; Chickenpox; Hyperlipidemia; Sleep apnea; and Type 2 diabetes, diet controlled ([facility]) (12/12/2018). He also has no past medical history of Asthma.  He  has a past surgical history that includes tonsillectomy.  Social History   Substance Use Topics   • Smoking status: Former Smoker     Packs/day: 0.25     Years: 15.00     Types: Cigarettes   • Smokeless tobacco: Never Used   • Alcohol use 0.0 - 1.2 oz/week     Social History     Social History Narrative   • No narrative on file     Family History   Problem Relation Age of Onset   • Diabetes Mother    • Lung Disease Father    • Diabetes Sister    • Sleep Apnea Neg Hx      Family Status   Relation Status   • Mo Alive   • Fa Alive   • Sis Alive   • Bro Alive   • Arturo Alive   • Arturo Alive   • Arturo Alive   • Son Alive   • Son Alive   • Neg Hx (Not Specified)         ROS  Problems listed discussed above, all other systems reviewed and negative     Objective:     Blood pressure 126/70,  "pulse 77, temperature 36.3 °C (97.4 °F), temperature source Temporal, resp. rate 16, height 1.626 m (5' 4\"), weight 94.3 kg (208 lb), SpO2 98 %. Body mass index is 35.7 kg/m².  Physical Exam:  General: Alert, oriented in no acute distress.  Eye contact is good, speech is normal, affect calm  HEENT: perrl, Oral mucosa pink moist, no lesions. Nares patent. TMs gray with good landmarks bilaterally. No cervical or supraclavicular lymphadenopathy, thyroid isthmus palpable without masses or nodules.  Lungs: clear to auscultation bilaterally, good aeration, normal effort. No wheeze/ rhonchi/ rales.  CV: regular rate and rhythm, S1, S2. No murmur, no JVD, no edema.  pedal pulses 2 + bilaterally  Abdomen: soft, nontender, BS x4,he is no hepatosplenomegaly.  Ext: color normal, vascularity normal, temperature normal. No rash or lesions.  MS: no point tenderness over spine, no obvious deformity. No joint swelling or redness. Strength is 5/5 globally  Neuro: DTR 2+ bilaterally  Assessment and Plan:   The following treatment plan was discussed   1. Annual physical exam  Normal physical exam. General health and wellness discussion including healthy diet, regular exercise. 2000 iu Vit d3 advised daily. Preventative health screenings up to date. Advised regular dental cleanings, eye exam yearly.  COMP METABOLIC PANEL    Lipid Profile    PROSTATE SPECIFIC AG SCREENING   2. JOSE (obstructive sleep apnea)  Enc consistent use of CPAP   3. Type 2 diabetes, diet controlled (HCC)   A1c 6.5.  Diet, exercise, weight loss recommendations reviewed  HEMOGLOBIN A1C    COMP METABOLIC PANEL    Lipid Profile   4. Obesity (BMI 30-39.9)  Weight loss recommendations reviewed   5. Screening for prostate cancer  PROSTATE SPECIFIC AG SCREENING   6. Influenza vaccine needed  I have placed the below orders and discussed them with an approved delegating provider. The MA is performing the below orders under the direction of Dr. De La Torre  Flu Quad Inj >3 Year " Pre-Filled PF       Educated in proper administration of medication(s) ordered today including safety, possible SE, risks, benefits, rationale and alternatives to therapy.     Followup: pending labs             Please note that this dictation was created using voice recognition software. I have worked with consultants from the vendor as well as technical experts from [facility] to optimize the interface. I have made every reasonable attempt to correct obvious errors, but I expect that there are errors of grammar and possibly content that I did not discover before finalizing the note.

## 2018-12-20 ENCOUNTER — TELEPHONE (OUTPATIENT)
Dept: MEDICAL GROUP | Facility: MEDICAL CENTER | Age: 53
End: 2018-12-20

## 2018-12-20 LAB
ALBUMIN SERPL-MCNC: 4.3 G/DL (ref 3.5–5.5)
ALBUMIN/GLOB SERPL: 1.3 {RATIO} (ref 1.2–2.2)
ALP SERPL-CCNC: 78 IU/L (ref 39–117)
ALT SERPL-CCNC: 97 IU/L (ref 0–44)
AST SERPL-CCNC: 45 IU/L (ref 0–40)
BILIRUB SERPL-MCNC: 1 MG/DL (ref 0–1.2)
BUN SERPL-MCNC: 15 MG/DL (ref 6–24)
BUN/CREAT SERPL: 15 (ref 9–20)
CALCIUM SERPL-MCNC: 9.3 MG/DL (ref 8.7–10.2)
CHLORIDE SERPL-SCNC: 101 MMOL/L (ref 96–106)
CHOLEST SERPL-MCNC: 168 MG/DL (ref 100–199)
CO2 SERPL-SCNC: 23 MMOL/L (ref 20–29)
CREAT SERPL-MCNC: 0.99 MG/DL (ref 0.76–1.27)
GLOBULIN SER CALC-MCNC: 3.3 G/DL (ref 1.5–4.5)
GLUCOSE SERPL-MCNC: 131 MG/DL (ref 65–99)
HBA1C MFR BLD: 7.3 % (ref 4.8–5.6)
HDLC SERPL-MCNC: 44 MG/DL
IF AFRICAN AMERICAN  100797: 101 ML/MIN/1.73
IF NON AFRICAN AMER 100791: 87 ML/MIN/1.73
LABORATORY COMMENT REPORT: ABNORMAL
LDLC SERPL CALC-MCNC: 100 MG/DL (ref 0–99)
POTASSIUM SERPL-SCNC: 4.2 MMOL/L (ref 3.5–5.2)
PROT SERPL-MCNC: 7.6 G/DL (ref 6–8.5)
PSA SERPL-MCNC: 6.2 NG/ML (ref 0–4)
SODIUM SERPL-SCNC: 140 MMOL/L (ref 134–144)
TRIGL SERPL-MCNC: 120 MG/DL (ref 0–149)
VLDLC SERPL CALC-MCNC: 24 MG/DL (ref 5–40)

## 2018-12-20 NOTE — TELEPHONE ENCOUNTER
----- Message from INDIO Keen sent at 12/20/2018  9:16 AM PST -----  Please schedule appt to review labs. Need to discuss elevated liver enzymes and worsening diabetes

## 2018-12-27 ENCOUNTER — OFFICE VISIT (OUTPATIENT)
Dept: MEDICAL GROUP | Facility: MEDICAL CENTER | Age: 53
End: 2018-12-27
Payer: COMMERCIAL

## 2018-12-27 VITALS
HEIGHT: 64 IN | BODY MASS INDEX: 35.51 KG/M2 | TEMPERATURE: 98.1 F | RESPIRATION RATE: 16 BRPM | SYSTOLIC BLOOD PRESSURE: 132 MMHG | WEIGHT: 208 LBS | DIASTOLIC BLOOD PRESSURE: 84 MMHG | OXYGEN SATURATION: 97 % | HEART RATE: 82 BPM

## 2018-12-27 DIAGNOSIS — R74.8 ELEVATED LIVER ENZYMES: ICD-10-CM

## 2018-12-27 DIAGNOSIS — R97.20 ELEVATED PSA: ICD-10-CM

## 2018-12-27 DIAGNOSIS — E66.9 OBESITY (BMI 30-39.9): ICD-10-CM

## 2018-12-27 DIAGNOSIS — K21.9 GASTROESOPHAGEAL REFLUX DISEASE WITHOUT ESOPHAGITIS: ICD-10-CM

## 2018-12-27 PROCEDURE — 99214 OFFICE O/P EST MOD 30 MIN: CPT | Performed by: NURSE PRACTITIONER

## 2018-12-27 NOTE — ASSESSMENT & PLAN NOTE
A1c has increased to 7.3, prior 6.5.  Admits to poor diet in the last few months, not exercising.  Is motivated to work on lifestyle change, strongly prefers to avoid medication if possible.  Denies polyuria, polydipsia

## 2018-12-27 NOTE — ASSESSMENT & PLAN NOTE
Component      Latest Ref Rng & Units 3/4/2016 12/19/2018           9:02 AM 11:45 AM   Prostatic Specific Antigen Tot      0.0 - 4.0 ng/mL 4.2 (H) 6.2 (H)   Denies difficulty urinating, weak stream, post avoid dribbling, hematuria.  Maternal uncle had prostate cancer

## 2018-12-27 NOTE — PROGRESS NOTES
"Subjective:     Chief Complaint   Patient presents with   • Follow-Up     LAB RESULTS     Judah Brooks is a 53 y.o. male here today to follow up on:    Elevated PSA  Component      Latest Ref Rng & Units 3/4/2016 12/19/2018           9:02 AM 11:45 AM   Prostatic Specific Antigen Tot      0.0 - 4.0 ng/mL 4.2 (H) 6.2 (H)   Denies difficulty urinating, weak stream, post avoid dribbling, hematuria.  Maternal uncle had prostate cancer    Uncontrolled type 2 diabetes mellitus without complication (HCC)  A1c has increased to 7.3, prior 6.5.  Admits to poor diet in the last few months, not exercising.  Is motivated to work on lifestyle change, strongly prefers to avoid medication if possible.  Denies polyuria, polydipsia    Elevated liver enzymes  Component      Latest Ref Rng & Units 1/25/2018 7/19/2018 12/19/2018           8:11 AM 11:38 AM 11:45 AM   AST(SGOT)      0 - 40 IU/L 22 37 45 (H)   ALT(SGPT)      0 - 44 IU/L 46 (H) 60 (H) 97 (H)   Labs reviewed, gradually increase in liver enzymes over the last year.  He is taking Tylenol twice daily, rarely drinks alcohol.  Denies abdominal pain, jaundice, nausea, fatigue, bruising       Current medicines (including changes today)  Current Outpatient Prescriptions   Medication Sig Dispense Refill   • ibuprofen (MOTRIN) 200 MG Tab Take 200 mg by mouth every 6 hours as needed.     • Acetaminophen (ARTHRITIS PAIN RELIEF PO) Take  by mouth.       No current facility-administered medications for this visit.      He  has a past medical history of Back pain; Chickenpox; Hyperlipidemia; Sleep apnea; and Type 2 diabetes, diet controlled (HCC) (12/12/2018). He also has no past medical history of Asthma.    ROS included above     Objective:     Blood pressure 132/84, pulse 82, temperature 36.7 °C (98.1 °F), temperature source Temporal, resp. rate 16, height 1.626 m (5' 4\"), weight 94.3 kg (208 lb), SpO2 97 %. Body mass index is 35.7 kg/m².     Physical Exam:  General: " Alert, oriented in no acute distress.  Eye contact is good, speech is normal, affect calm  Lungs: clear to auscultation bilaterally, normal effort, no wheeze/ rhonchi/ rales.  CV: regular rate and rhythm, S1, S2, no murmur  Abdomen: soft, nontender, no hepatosplenomegaly  Ext: no edema, color normal, vascularity normal, temperature normal    Assessment and Plan:   The following treatment plan was discussed   1. Elevated liver enzymes   labs reviewed, evaluate ultrasound.  Advised reduction and Tylenol use  US-RUQ    COMP METABOLIC PANEL   2. Uncontrolled type 2 diabetes mellitus without complication (HCC)   recent labs reviewed, he is motivated to work on diet exercise and weight loss.  Will hold off on medication at this time, repeat labs in 6 months  HEMOGLOBIN A1C    COMP METABOLIC PANEL    Lipid Profile   3. Gastroesophageal reflux disease without esophagitis   stable   4. Obesity (BMI 30-39.9)   encouraged weight loss   5. Elevated PSA   denies urinary symptoms.  He did have a maternal uncle with prostate cancer  REFERRAL TO UROLOGY       Followup: 6 months with labs prior         Please note that this dictation was created using voice recognition software. I have worked with consultants from the vendor as well as technical experts from "[email or web address] to optimize the interface. I have made every reasonable attempt to correct obvious errors, but I expect that there are errors of grammar and possibly content that I did not discover before finalizing the note.

## 2018-12-27 NOTE — PATIENT INSTRUCTIONS
1. referral to urology placed- they should call you to schedule  2. Work on diet, exercise, weight loss  3. Ultrasound of liver, call to schedule [phone]  4. Labs in 6 months

## 2018-12-27 NOTE — ASSESSMENT & PLAN NOTE
Component      Latest Ref Rng & Units 1/25/2018 7/19/2018 12/19/2018           8:11 AM 11:38 AM 11:45 AM   AST(SGOT)      0 - 40 IU/L 22 37 45 (H)   ALT(SGPT)      0 - 44 IU/L 46 (H) 60 (H) 97 (H)   Labs reviewed, gradually increase in liver enzymes over the last year.  He is taking Tylenol twice daily, rarely drinks alcohol.  Denies abdominal pain, jaundice, nausea, fatigue, bruising

## 2019-01-10 ENCOUNTER — HOSPITAL ENCOUNTER (OUTPATIENT)
Dept: RADIOLOGY | Facility: MEDICAL CENTER | Age: 54
End: 2019-01-10
Attending: NURSE PRACTITIONER
Payer: COMMERCIAL

## 2019-01-10 DIAGNOSIS — R74.8 ELEVATED LIVER ENZYMES: ICD-10-CM

## 2019-01-10 PROCEDURE — 76705 ECHO EXAM OF ABDOMEN: CPT

## 2019-01-15 DIAGNOSIS — E88.89 STEATOSIS (HCC): ICD-10-CM

## 2019-01-15 DIAGNOSIS — R74.8 ELEVATED LIVER ENZYMES: ICD-10-CM

## 2019-01-15 DIAGNOSIS — R16.0 HEPATOMEGALY: ICD-10-CM

## 2019-01-16 ENCOUNTER — TELEPHONE (OUTPATIENT)
Dept: MEDICAL GROUP | Facility: MEDICAL CENTER | Age: 54
End: 2019-01-16

## 2019-01-16 NOTE — TELEPHONE ENCOUNTER
----- Message from INDIO Keen sent at 1/15/2019  8:10 PM PST -----  Please inform patient u/s shows liver is enlarged and has fat accumulation. Recommend continuing to work on weight loss as we discussed at his visit.  I have also placed referral for him to see a gastroenterologist (liver specialist) for consultation

## 2019-04-30 ENCOUNTER — OFFICE VISIT (OUTPATIENT)
Dept: MEDICAL GROUP | Facility: MEDICAL CENTER | Age: 54
End: 2019-04-30
Payer: COMMERCIAL

## 2019-04-30 VITALS
BODY MASS INDEX: 34.15 KG/M2 | TEMPERATURE: 97.4 F | DIASTOLIC BLOOD PRESSURE: 88 MMHG | HEIGHT: 64 IN | WEIGHT: 200 LBS | HEART RATE: 89 BPM | RESPIRATION RATE: 16 BRPM | SYSTOLIC BLOOD PRESSURE: 130 MMHG | OXYGEN SATURATION: 99 %

## 2019-04-30 DIAGNOSIS — K76.0 FATTY LIVER: ICD-10-CM

## 2019-04-30 DIAGNOSIS — G89.29 CHRONIC NECK PAIN: ICD-10-CM

## 2019-04-30 DIAGNOSIS — K21.9 GASTROESOPHAGEAL REFLUX DISEASE WITHOUT ESOPHAGITIS: ICD-10-CM

## 2019-04-30 DIAGNOSIS — R97.20 ELEVATED PSA: ICD-10-CM

## 2019-04-30 DIAGNOSIS — M54.2 CHRONIC NECK PAIN: ICD-10-CM

## 2019-04-30 DIAGNOSIS — G89.29 CHRONIC PAIN OF LEFT KNEE: ICD-10-CM

## 2019-04-30 DIAGNOSIS — G47.33 OSA (OBSTRUCTIVE SLEEP APNEA): ICD-10-CM

## 2019-04-30 DIAGNOSIS — M25.562 CHRONIC PAIN OF LEFT KNEE: ICD-10-CM

## 2019-04-30 PROCEDURE — 99214 OFFICE O/P EST MOD 30 MIN: CPT | Performed by: NURSE PRACTITIONER

## 2019-04-30 ASSESSMENT — PATIENT HEALTH QUESTIONNAIRE - PHQ9: CLINICAL INTERPRETATION OF PHQ2 SCORE: 0

## 2019-04-30 NOTE — PATIENT INSTRUCTIONS
1. Complete fasting labs  2. Complete xray of the knee  3. Call to schedule physical therapy [phone]  4. Pain management office will call you to schedule- takes about a week.

## 2019-04-30 NOTE — ASSESSMENT & PLAN NOTE
Continues to have intermittent issues, managing with prilosec as needed- not daily. Better with recent weight loss

## 2019-04-30 NOTE — ASSESSMENT & PLAN NOTE
Last a1c 7.3  Has been working on diet and exercise, has lost 8 lb  Reduced carbs, eating more vegetables. Hopeful numbers will have improved.   No medications for this issue

## 2019-04-30 NOTE — ASSESSMENT & PLAN NOTE
Increased difficulty with left knee pain over the last few months, now reliant on taking ibuprofen at least twice daily. Pain tends to be lateral, aching, worse with long periods of standing/ walking. No prior imaging. No specific h/o injury. No instability

## 2019-05-01 NOTE — ASSESSMENT & PLAN NOTE
"Difficulty with neck pain in the last several months, sometimes with radiation to either the R or L shoulder area. Muscles feel \"tight\", achy, sometimes tender to touch. No h/o injury. No upper extremity numbness/ tingling, weakness. No change in ROM. Xray from Oct last year with mild disc space narrowing at C5-6 and C6-7    "

## 2019-05-01 NOTE — ASSESSMENT & PLAN NOTE
Last labs with PSA increased to 6.2. Was seen by urology with normal exam, will have repeat labs in June/ July. Denies urinary problems.

## 2019-05-01 NOTE — ASSESSMENT & PLAN NOTE
Liver u/s reviewed  Labs in Dec with AST 45, ALT 97  Actively working on weight loss. No abd pain, fatigue, bruising

## 2019-05-01 NOTE — PROGRESS NOTES
"Subjective:     Chief Complaint   Patient presents with   • Other     GENERAL CHECK UP      Judah Hanna is a 53 y.o. male here today to follow up on:    Uncontrolled type 2 diabetes mellitus without complication (HCC)  Last a1c 7.3  Has been working on diet and exercise, has lost 8 lb  Reduced carbs, eating more vegetables. Hopeful numbers will have improved.   No medications for this issue    JOSE (obstructive sleep apnea)  Reports consistent use of CPAP. Sleeping well, waking up feeling resting    Gastroesophageal reflux disease without esophagitis  Continues to have intermittent issues, managing with prilosec as needed- not daily. Better with recent weight loss    Chronic pain of left knee  Increased difficulty with left knee pain over the last few months, now reliant on taking ibuprofen at least twice daily. Pain tends to be lateral, aching, worse with long periods of standing/ walking. No prior imaging. No specific h/o injury. No instability    Fatty liver  Liver u/s reviewed  Labs in Dec with AST 45, ALT 97  Actively working on weight loss. No abd pain, fatigue, bruising    Elevated PSA  Last labs with PSA increased to 6.2. Was seen by urology with normal exam, will have repeat labs in June/ July. Denies urinary problems.    Chronic neck pain  Difficulty with neck pain in the last several months, sometimes with radiation to either the R or L shoulder area. Muscles feel \"tight\", achy, sometimes tender to touch. No h/o injury. No upper extremity numbness/ tingling, weakness. No change in ROM. Xray from Oct last year with mild disc space narrowing at C5-6 and C6-7         Current medicines (including changes today)  Current Outpatient Prescriptions   Medication Sig Dispense Refill   • Acetaminophen (ARTHRITIS PAIN RELIEF PO) Take  by mouth.     • ibuprofen (MOTRIN) 200 MG Tab Take 200 mg by mouth every 6 hours as needed.       No current facility-administered medications for this visit.      He  " "has a past medical history of Back pain; Chickenpox; Hyperlipidemia; Sleep apnea; and Type 2 diabetes, diet controlled (HCC) (12/12/2018). He also has no past medical history of Asthma.    ROS included above     Objective:     /88 (BP Location: Left arm, Patient Position: Sitting, BP Cuff Size: Adult)   Pulse 89   Temp 36.3 °C (97.4 °F) (Temporal)   Resp 16   Ht 1.626 m (5' 4\")   Wt 90.7 kg (200 lb)   SpO2 99%  Body mass index is 34.33 kg/m².     Physical Exam:  General: Alert, oriented in no acute distress.  Eye contact is good, speech is normal, affect calm  Lungs: clear to auscultation bilaterally, normal effort, no wheeze/ rhonchi/ rales.  CV: regular rate and rhythm, S1, S2, no murmur  Abdomen: soft, nontender  MS: no point tenderness over cervical spinous process, no deformity. Normal ROM, strength 5/5 in bilateral UE. Shoulders with normal ROM. No tenderness in bilateral trapezius. L knee without point tenderness or swelling, normal ROM with no clicking   Ext: no edema, color normal, vascularity normal, temperature normal    Assessment and Plan:   The following treatment plan was discussed   1. Uncontrolled type 2 diabetes mellitus without complication (HCC)  a1c had increased at last check, he has been working on diet change and weight loss. Update labs, we will f/u pending results   2. JOSE (obstructive sleep apnea)  stable   3. Gastroesophageal reflux disease without esophagitis  Stable, some improvement with weigh tloss   4. Chronic pain of left knee  Daily aching in the L knee without h/o injury, taking ibuprofen regularly. Will evaluate imaging, refer for Pt and physiatry eval  REFERRAL TO PHYSICAL THERAPY Reason for Therapy: Eval/Treat/Report    DX-KNEE 2- LEFT   5. Fatty liver  Enc to continue weight loss efforts. Labs as previously ordered.   6. Chronic neck pain  Imaging reviewed, will refer for evaluation/ treatment  REFERRAL TO PHYSIATRY (PMR)   7. Elevated PSA  Recently seen by " urology, planning for repeat labs in the next 2-3 months       Followup: pending labs         Please note that this dictation was created using voice recognition software. I have worked with consultants from the vendor as well as technical experts from [facility] to optimize the interface. I have made every reasonable attempt to correct obvious errors, but I expect that there are errors of grammar and possibly content that I did not discover before finalizing the note.

## 2019-05-07 ENCOUNTER — HOSPITAL ENCOUNTER (OUTPATIENT)
Dept: RADIOLOGY | Facility: MEDICAL CENTER | Age: 54
End: 2019-05-07
Attending: NURSE PRACTITIONER
Payer: COMMERCIAL

## 2019-05-07 DIAGNOSIS — M25.562 CHRONIC PAIN OF LEFT KNEE: ICD-10-CM

## 2019-05-07 DIAGNOSIS — G89.29 CHRONIC PAIN OF LEFT KNEE: ICD-10-CM

## 2019-05-07 PROCEDURE — 73560 X-RAY EXAM OF KNEE 1 OR 2: CPT | Mod: LT

## 2019-05-08 LAB
ALBUMIN SERPL-MCNC: 4.5 G/DL (ref 3.5–5.5)
ALBUMIN/GLOB SERPL: 1.6 {RATIO} (ref 1.2–2.2)
ALP SERPL-CCNC: 72 IU/L (ref 39–117)
ALT SERPL-CCNC: 34 IU/L (ref 0–44)
AST SERPL-CCNC: 21 IU/L (ref 0–40)
BILIRUB SERPL-MCNC: 0.9 MG/DL (ref 0–1.2)
BUN SERPL-MCNC: 15 MG/DL (ref 6–24)
BUN/CREAT SERPL: 15 (ref 9–20)
CALCIUM SERPL-MCNC: 9.5 MG/DL (ref 8.7–10.2)
CHLORIDE SERPL-SCNC: 104 MMOL/L (ref 96–106)
CHOLEST SERPL-MCNC: 156 MG/DL (ref 100–199)
CO2 SERPL-SCNC: 23 MMOL/L (ref 20–29)
CREAT SERPL-MCNC: 0.99 MG/DL (ref 0.76–1.27)
GLOBULIN SER CALC-MCNC: 2.8 G/DL (ref 1.5–4.5)
GLUCOSE SERPL-MCNC: 117 MG/DL (ref 65–99)
HBA1C MFR BLD: 6.4 % (ref 4.8–5.6)
HDLC SERPL-MCNC: 45 MG/DL
LABORATORY COMMENT REPORT: NORMAL
LDLC SERPL CALC-MCNC: 95 MG/DL (ref 0–99)
POTASSIUM SERPL-SCNC: 4.2 MMOL/L (ref 3.5–5.2)
PROT SERPL-MCNC: 7.3 G/DL (ref 6–8.5)
SODIUM SERPL-SCNC: 140 MMOL/L (ref 134–144)
TRIGL SERPL-MCNC: 80 MG/DL (ref 0–149)
VLDLC SERPL CALC-MCNC: 16 MG/DL (ref 5–40)

## 2019-05-10 ENCOUNTER — TELEPHONE (OUTPATIENT)
Dept: MEDICAL GROUP | Facility: MEDICAL CENTER | Age: 54
End: 2019-05-10

## 2019-05-10 NOTE — TELEPHONE ENCOUNTER
----- Message from INDIO Keen sent at 5/8/2019 10:42 AM PDT -----  Please inform patient diabetes has improved with A1c now at 6.4, this is very good.  Liver function tests are improved as well.  Continue current diet and exercise efforts, please plan for follow-up appointment in 6 months

## 2019-05-10 NOTE — TELEPHONE ENCOUNTER
----- Message from INDIO Keen sent at 5/7/2019  5:16 PM PDT -----  Please inform patient x-ray of the knee is normal.  Would he like to try physical therapy, or I can place a referral to orthopedics?

## 2019-06-18 ENCOUNTER — APPOINTMENT (OUTPATIENT)
Dept: PHYSICAL THERAPY | Facility: MEDICAL CENTER | Age: 54
End: 2019-06-18
Attending: NURSE PRACTITIONER
Payer: COMMERCIAL

## 2019-06-25 ENCOUNTER — APPOINTMENT (OUTPATIENT)
Dept: PHYSICAL THERAPY | Facility: MEDICAL CENTER | Age: 54
End: 2019-06-25
Attending: NURSE PRACTITIONER
Payer: COMMERCIAL

## 2019-07-02 ENCOUNTER — PHYSICAL THERAPY (OUTPATIENT)
Dept: PHYSICAL THERAPY | Facility: MEDICAL CENTER | Age: 54
End: 2019-07-02
Attending: NURSE PRACTITIONER
Payer: COMMERCIAL

## 2019-07-02 DIAGNOSIS — G89.29 CHRONIC PAIN OF LEFT KNEE: ICD-10-CM

## 2019-07-02 DIAGNOSIS — M25.562 CHRONIC PAIN OF LEFT KNEE: ICD-10-CM

## 2019-07-02 PROCEDURE — 97162 PT EVAL MOD COMPLEX 30 MIN: CPT

## 2019-07-02 PROCEDURE — 97012 MECHANICAL TRACTION THERAPY: CPT

## 2019-07-02 ASSESSMENT — ENCOUNTER SYMPTOMS
PAIN SCALE: 8
QUALITY: SHARP
PAIN SCALE AT LOWEST: 0
QUALITY: BURNING

## 2019-07-02 ASSESSMENT — ACTIVITIES OF DAILY LIVING (ADL): POOR_BALANCE: 1

## 2019-07-02 NOTE — OP THERAPY EVALUATION
"  Outpatient Physical Therapy  INITIAL EVALUATION    [facility]  [address]  [place] [address]  Phone:  [phone]  Fax:  [phone]    Date of Evaluation: 07/02/2019    Patient: Judah Hanna  YOB: 1965  MRN: [id number]     Referring Provider: INDIO Keen  [address]  [address]  [place] [address]   Referring Diagnosis No admission diagnoses are documented for this encounter.     Time Calculation  Start time: 1430  Stop time: 1540 Time Calculation (min): 70 minutes     Physical Therapy Occurrence Codes    Date of onset of impairment:  4/30/19   Date physical therapy care plan established or reviewed:  7/2/19   Date physical therapy treatment started:  7/2/19          Chief Complaint: Back Problem and Knee Problem    Visit Diagnoses     ICD-10-CM   1. Chronic pain of left knee M25.562    G89.29         Subjective:   History of Present Illness:     Mechanism of injury:  Pt's daughter was present for eval but pt main historian.    Pt \"Judah\" states everything hurts. He will wake up at night with the pain. He has pain in his knees and when he wakes up at night and goes to move them it feels tight, like bone and bone. If he moves his legs to the side it will really bother his knees. The pain will start in his L hip and work into the L knee and sometimes to the foot, mostly on the lateral side of the knee.     He also has foot pain. He dropped a drawer on his foot at work and it has been painful since then.     He also has back pain. And it just hurts to move, getting up from the chair is painful. Pt denies n/t. Pt has no b/b symptoms. Pt has been trying to lose weight to prevent diabetes.    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    He takes ibuprofen every 4 hours, it dulls it, but does not " really help a ton. If he takes more than 4, it helps.     Pt has nothing to add to PMHx. Pt has no known allergies.   Sleep disturbance:  Interrupted sleep  Pain:     Current pain ratin    At best pain ratin (when he is working, no time to think about it)    Quality:  Burning and sharp (sharp L knee, burning R knee, burning pain in the R foot)    Pain timing: Fine at work when he is busy at work, but when he stops and rests, that is when he is painful.    Alleviating factors: ibuprofen.    Exacerbated by: bending down, laying on L side, getting up after sitting for awhile.  Patient Goals:     Patient goals for therapy:  Increased motion, decreased pain and increased strength    Other patient goals:  Lifting things at work, carrying more      Past Medical History:   Diagnosis Date   • Back pain    • Chickenpox    • Hyperlipidemia    • Sleep apnea    • Type 2 diabetes, diet controlled ([facility]) 2018     Past Surgical History:   Procedure Laterality Date   • TONSILLECTOMY       Social History   Substance Use Topics   • Smoking status: Former Smoker     Packs/day: 0.25     Years: 15.00     Types: Cigarettes   • Smokeless tobacco: Never Used   • Alcohol use 0.0 - 1.2 oz/week     Family and Occupational History     Social History   • Marital status:      Spouse name: N/A   • Number of children: N/A   • Years of education: N/A       Objective     Hip Screen   Hip range of motion within functional limits with the following exceptions: Restricted into L hip ER, pain in back at end range  Hip strength within functional limits with the following exceptions: MMT: 4+/5 R hip flexor, 4/5 for L hip flexor (pain in R hip)    Neurological Testing     Reflexes   Left   Patellar (L4): normal (2+)  Achilles (S1): normal (2+)    Right   Patellar (L4): normal (2+)  Achilles (S1): normal (2+)    Myotome testing   Lumbar (left)   L1 (hip flexors): 4  L2 (hip flexors): 4  L3 (knee extensors): 5  L4 (ankle dorsiflexors):  5  L5 (great toe extension): 5  S1 (ankle plantar flexors): 5 (4/5 for L knee flexion)    Lumbar (right)   L1 (hip flexors): 4+  L2 (hip flexors): 4+  L3 (knee extensors): 5  L4 (ankle dorsiflexors): 5  S1 (ankle plantar flexors): 5    Palpation   Left   Tenderness of the gluteus french, gluteus medius, lumbar paraspinals, piriformis, quadratus lumborum and TFL.     Right   Tenderness of the gluteus french, gluteus medius, lumbar paraspinals, piriformis, quadratus lumborum and TFL.     Additional Palpation Details  Tenderness B, but always L>>R    Tenderness   Left Knee   Tenderness in the ITB, lateral joint line and medial joint line.     Right Knee   No tenderness in the ITB, lateral joint line and medial joint line.     Active Range of Motion     Lumbar   Flexion: decreased  Extension: decreased  Left lateral flexion: decreased  Right lateral flexion: decreased  Left rotation: decreased  Right rotation: decreased    Additional Active Range of Motion Details  Pain with all lx ROM, no referral pain with ROM    Joint Play   Spine     Central PA Callahan        T10: WFL       T11: WFL and painful       T12: WFL and painful       L1: hypomobile       L2: hypomobile       L3: hypomobile and painful       L4: hypomobile and painful       L5: hypomobile and painful       S1: hypomobile and painful      Additional Joint Play Details  Most pain at T10 and L3-5 with     Strength:      Abdominals   Lower abdominals: Able to initiate but not maintain neutral    Left Hip   Planes of Motion   Flexion: 4    Right Hip   Planes of Motion   Flexion: 4+    Left Knee   Flexion: 4  Extension: 5    Right Knee   Flexion: 5  Extension: 5    Left Ankle/Foot   Dorsiflexion: 5  Plantar flexion: 5    Right Ankle/Foot   Dorsiflexion: 5  Plantar flexion: 5    Tests       Lumbar spine (left)      Positive slump.   Lumbar spine (right)     Negative slump.     Left Hip   SLR: Negative.     Right Hip   SLR: Negative.     Left Knee   Negative  "anterior Lachman, lateral Kerry, medial Kerry, valgus stress test at 0 degrees, valgus stress test at 30 degrees, varus stress test at 0 degrees and varus stress test at 30 degrees.     Right Knee   Negative anterior Lachman, lateral Kerry, medial Kerry, valgus stress test at 0 degrees, valgus stress test at 30 degrees, varus stress test at 0 degrees and varus stress test at 30 degrees.         Therapeutic Exercises (CPT 11849):     1. LTR, 10 x 1    2. SKTC, 30\" x 1    3. Piriformis reg, 30\" x 1    4. Hamstring glide, 10 x 1    5. ADIM, 5\" x 10 x 1, painful, decreased with VCs to perform more gently    10. UPOC: 08/27/19    Therapeutic Treatments and Modalities:     1. Mechanical Traction (CPT 47338), Lx traction, 80/60lbs, 60/20\" x 15 min with MHP, Pt felt pull could be stronger    Time-based treatments/modalities:  Therapeutic exercise minutes (CPT 12615): 5 minutes       Assessment, Response and Plan:   Impairments: abnormal muscle firing, abnormal or restricted ROM, activity intolerance, impaired balance, impaired physical strength, lacks appropriate home exercise program and pain with function    Assessment details:  Pt is a pleasant, cooperative 55 yo male who presents with LLE pain. Pt has s/s consistent with LBP with referral pain into his LLE. Pt also has L knee pain that may be indicative of knee OA given his joint pain and pain with twisting; however, most of his pain appears to be referral pain from his lumbar spine. Pt will benefit from skilled physical therapy in order to strengthen his core and hips, improve his lx painfree ROM, and decrease his BLE pain in order to return to ADLs and recreational activities with less pain and restriction.    Pt's R foot not specifically addressed, will assess more thoroughly in later sessions.  Other barriers to therapy:  Vague, chronic pain  Goals:   Short Term Goals:   1. Pt is to be able to perform ADIM without increase in back pain  2. Pt is to be " able to perform lx flexion without increased pain  3. Pt is to perform HEP without cueing demonstrating compliance  Short term goal time span:  2-4 weeks      Long Term Goals:    1. Pt is to be able to return to carry/lifting things normally at work without restriction  2. Pt is to have 6/10 VAS pain at the worst in the back and LLE  3. Pt is to have 4/10 VAS pain at rest in the back and LLE.  Long term goal time span:  6-8 weeks    Plan:   Therapy options:  Physical therapy treatment to continue  Planned therapy interventions:  E Stim Unattended (CPT 92021), Gait Training (CPT 56396), Manual Therapy (CPT 41471), Neuromuscular Re-education (CPT 16313), Mechanical Traction (CPT 51561), Therapeutic Activities (CPT 94939) and Therapeutic Exercise (CPT 11339)  Frequency:  2x week  Duration in weeks:  8  Discussed with:  Patient  Plan details:  1-2x/week for 8 weeks      Functional Limitations and Severity Modifiers  WOMAC Grand Total: 69.79   Current:  na   Goal:  na     Referring provider co-signature:  I have reviewed this plan of care and my co-signature certifies the need for services.  Certification Dates:   From 07/02/19     To 08/27/19    Physician Signature: ________________________________ Date: ______________

## 2019-07-09 ENCOUNTER — APPOINTMENT (OUTPATIENT)
Dept: PHYSICAL THERAPY | Facility: MEDICAL CENTER | Age: 54
End: 2019-07-09
Attending: NURSE PRACTITIONER
Payer: COMMERCIAL

## 2019-08-29 ENCOUNTER — TELEPHONE (OUTPATIENT)
Dept: PHYSICAL THERAPY | Facility: MEDICAL CENTER | Age: 54
End: 2019-08-29

## 2019-08-29 NOTE — OP THERAPY DISCHARGE SUMMARY
Outpatient Physical Therapy  DISCHARGE SUMMARY NOTE      [facility]  [address]  [place] [address]  Phone:  [phone]  Fax:  [phone]    Date of Visit: 08/29/2019    Patient: Judah Hanna  YOB: 1965  MRN: [id number]     Referring Provider: INDIO Keen   Referring Diagnosis No admission diagnoses are documented for this encounter.     Physical Therapy Occurrence Codes    Date of onset of impairment:  4/30/19   Date physical therapy care plan established or reviewed:  7/2/19   Date physical therapy treatment started:  7/2/19          Functional Assessment Used        Your patient is being discharged from Physical Therapy with the following comments:   · Patient has failed to schedule or reschedule follow-up visits    Comments:  Pt has failed to reschedule f/u and has now been >30 days since evaluation. Pt d/c per policy     Limitations Remaining:  Unable to reassess    Recommendations:  Pt to d/c from PT as it has been >30 days. Pt welcome to return to PT with new referral from provider if deemed appropriate.    Lianet Mota, PT, DPT    Date: 8/29/2019

## 2019-09-17 ENCOUNTER — OFFICE VISIT (OUTPATIENT)
Dept: MEDICAL GROUP | Facility: MEDICAL CENTER | Age: 54
End: 2019-09-17
Payer: COMMERCIAL

## 2019-09-17 VITALS
WEIGHT: 202 LBS | BODY MASS INDEX: 34.49 KG/M2 | DIASTOLIC BLOOD PRESSURE: 80 MMHG | RESPIRATION RATE: 16 BRPM | HEART RATE: 84 BPM | OXYGEN SATURATION: 96 % | SYSTOLIC BLOOD PRESSURE: 122 MMHG | HEIGHT: 64 IN | TEMPERATURE: 97.4 F

## 2019-09-17 DIAGNOSIS — H54.7 WORSENING VISION: ICD-10-CM

## 2019-09-17 DIAGNOSIS — M79.671 RIGHT FOOT PAIN: ICD-10-CM

## 2019-09-17 DIAGNOSIS — M25.561 CHRONIC PAIN OF BOTH KNEES: ICD-10-CM

## 2019-09-17 DIAGNOSIS — G89.29 CHRONIC PAIN OF BOTH KNEES: ICD-10-CM

## 2019-09-17 DIAGNOSIS — M25.562 CHRONIC PAIN OF BOTH KNEES: ICD-10-CM

## 2019-09-17 DIAGNOSIS — E11.9 CONTROLLED TYPE 2 DIABETES MELLITUS WITHOUT COMPLICATION, WITHOUT LONG-TERM CURRENT USE OF INSULIN (HCC): ICD-10-CM

## 2019-09-17 PROCEDURE — 99214 OFFICE O/P EST MOD 30 MIN: CPT | Performed by: NURSE PRACTITIONER

## 2019-09-17 NOTE — ASSESSMENT & PLAN NOTE
Diet controlled with last a1c 6.4  Continuing to work on diet.  No regular exercise.  No polyuria, polydipsia, numbness or tingling in extremities.  He does mention that his vision seems to be worsening, he has had to increase the strength of his reading glasses.  He is due for an eye exam, no history of retinopathy

## 2019-09-17 NOTE — ASSESSMENT & PLAN NOTE
He has had long-standing difficulty with left knee pain but now finds that the right knee is also bothering him.  Exacerbated by long periods of standing, walking, going up stairs.  Pain is in the front of the knee, under the patella.  He woke up a few nights ago in the middle the night and felt like he could not move the right leg because he was in such discomfort.  He has been taking acetaminophen 500 mg as needed, only slight relief with this.  He did have referral to physical therapy for the left knee but his referral is , he is requesting a new order to work with both knees.  Prior imaging on the left was unremarkable

## 2019-09-17 NOTE — PROGRESS NOTES
Subjective:     Chief Complaint   Patient presents with   • Referral Needed     PHYSICAL THERAPY      Judah Hanna is a 54 y.o. male here today to follow up on:    Controlled type 2 diabetes mellitus without complication, without long-term current use of insulin ([facility])  Diet controlled with last a1c 6.4  Continuing to work on diet.  No regular exercise.  No polyuria, polydipsia, numbness or tingling in extremities.  He does mention that his vision seems to be worsening, he has had to increase the strength of his reading glasses.  He is due for an eye exam, no history of retinopathy    Chronic pain of both knees  He has had long-standing difficulty with left knee pain but now finds that the right knee is also bothering him.  Exacerbated by long periods of standing, walking, going up stairs.  Pain is in the front of the knee, under the patella.  He woke up a few nights ago in the middle the night and felt like he could not move the right leg because he was in such discomfort.  He has been taking acetaminophen 500 mg as needed, only slight relief with this.  He did have referral to physical therapy for the left knee but his referral is , he is requesting a new order to work with both knees.  Prior imaging on the left was unremarkable    Right foot pain  Patient reports foot injury about 3 months ago-was opening a drawer when the drawer fell out and landed on his right foot.  He did have bruising and swelling at the time which gradually healed.  Unfortunately, he continues to have significant foot pain-up to 10 out of 10 at times.  Seems to occur intermittently, not necessarily associated with activity or shoewear.  Comes on and is sharp, he has to take weight off the foot.  No prior imaging       Current medicines (including changes today)  Current Outpatient Medications   Medication Sig Dispense Refill   • Acetaminophen (ARTHRITIS PAIN RELIEF PO) Take  by mouth.     • ibuprofen (MOTRIN) 200 MG  "Tab Take 200 mg by mouth every 6 hours as needed.       No current facility-administered medications for this visit.      He  has a past medical history of Back pain, Chickenpox, Hyperlipidemia, Sleep apnea, and Type 2 diabetes, diet controlled ([facility]) (12/12/2018). He also has no past medical history of Asthma.    ROS included above     Objective:     /80 (BP Location: Left arm, Patient Position: Sitting, BP Cuff Size: Adult)   Pulse 84   Temp 36.3 °C (97.4 °F) (Temporal)   Resp 16   Ht 1.626 m (5' 4\")   Wt 91.6 kg (202 lb)   SpO2 96%  Body mass index is 34.67 kg/m².     Physical Exam:  General: Alert, oriented in no acute distress.  Eye contact is good, speech is normal, affect calm  Lungs: clear to auscultation bilaterally, normal effort, no wheeze/ rhonchi/ rales.  CV: regular rate and rhythm, S1, S2, no murmur  MS: No point tenderness over bilateral knee joints, normal range of motion bilaterally, no grinding or popping, no joint swelling. Tenderness over the right distal 1/3 of the foot without ecchymosis, no swelling or deformity  Ext: no edema, color normal, vascularity normal, temperature normal    Assessment and Plan:   The following treatment plan was discussed  1. Chronic pain of both knees   chronic issue, prior imaging on the left knee showed no significant abnormality.  He is interested in working with physical therapy, new referral placed.  May continue with acetaminophen 500 mg as needed, may also try Aleve 1-2 times daily with food.  He will follow-up if no improvement with PT  REFERRAL TO PHYSICAL THERAPY Reason for Therapy: Eval/Treat/Report       2. Controlled type 2 diabetes mellitus without complication, without long-term current use of insulin ([facility])   stable with last A1c of 6.4, labs in November  HEMOGLOBIN A1C    Comp Metabolic Panel    Lipid Profile   3. Worsening vision   patient is increase his strength of his reading glasses, also due for retinal exam  REFERRAL TO " OPHTHALMOLOGY   4. Right foot pain   persistent pain over the last 3 months, will evaluate x-ray.  I will contact him with results  DX-FOOT-COMPLETE 3+ RIGHT    REFERRAL TO PHYSICAL THERAPY Reason for Therapy: Eval/Treat/Report       Followup: As needed         Please note that this dictation was created using voice recognition software. I have worked with consultants from the vendor as well as technical experts from [facility] to optimize the interface. I have made every reasonable attempt to correct obvious errors, but I expect that there are errors of grammar and possibly content that I did not discover before finalizing the note.

## 2019-09-17 NOTE — ASSESSMENT & PLAN NOTE
Patient reports foot injury about 3 months ago-was opening a drawer when the drawer fell out and landed on his right foot.  He did have bruising and swelling at the time which gradually healed.  Unfortunately, he continues to have significant foot pain-up to 10 out of 10 at times.  Seems to occur intermittently, not necessarily associated with activity or shoewear.  Comes on and is sharp, he has to take weight off the foot.  No prior imaging

## 2019-09-26 ENCOUNTER — HOSPITAL ENCOUNTER (OUTPATIENT)
Dept: RADIOLOGY | Facility: MEDICAL CENTER | Age: 54
End: 2019-09-26
Attending: NURSE PRACTITIONER
Payer: COMMERCIAL

## 2019-09-26 DIAGNOSIS — M79.671 RIGHT FOOT PAIN: ICD-10-CM

## 2019-09-26 PROCEDURE — 73630 X-RAY EXAM OF FOOT: CPT | Mod: RT

## 2019-09-27 ENCOUNTER — TELEPHONE (OUTPATIENT)
Dept: MEDICAL GROUP | Facility: MEDICAL CENTER | Age: 54
End: 2019-09-27

## 2019-09-27 NOTE — TELEPHONE ENCOUNTER
Patient called back, notified him of results, will try something over the counter for pain and will call back on Tuesday morning if pain is not better.

## 2019-09-27 NOTE — TELEPHONE ENCOUNTER
----- Message from INDIO Keen sent at 9/26/2019  5:17 PM PDT -----  Please inform pt xray shows arthritis where he is having pain. Otherwise normal, no indication of fractures

## 2019-10-15 ENCOUNTER — TELEPHONE (OUTPATIENT)
Dept: MEDICAL GROUP | Facility: MEDICAL CENTER | Age: 54
End: 2019-10-15

## 2019-10-15 NOTE — TELEPHONE ENCOUNTER
1. Name: Judah Hanna      Call Back Number: [phone] (home)      Patient approves a detailed voicemail message: N\A    Patient states he is still having intense pain in his feet. He states that Modesta informed him that if the Over the counter was not enough that she could give him something else. Please advise. Thank you.

## 2019-10-17 NOTE — TELEPHONE ENCOUNTER
Prescription sent for meloxicam 1 tab by mouth daily with food.  Please advise patient not to combine this with over-the-counter NSAIDs which would include ibuprofen, Motrin, Aleve.  He may take Tylenol if needed.  Please let me know if this is not helping.  I can also place referral to orthopedics if he would like

## 2019-10-18 NOTE — TELEPHONE ENCOUNTER
Patient notified.  Will try the prescription and see if this is working and will call back in one to two weeks if not working.

## 2019-10-28 ENCOUNTER — TELEPHONE (OUTPATIENT)
Dept: MEDICAL GROUP | Facility: MEDICAL CENTER | Age: 54
End: 2019-10-28

## 2019-10-28 NOTE — TELEPHONE ENCOUNTER
Please let him know there is a coupon on [employer] to get this for approx $5 at [facility]. Recommend trying that first. [facility] [place]

## 2019-10-28 NOTE — TELEPHONE ENCOUNTER
1. Name: Efren Salazar Jacques      Call Back Number: [phone] (home)      Patient approves a detailed voicemail message: N\A    Patient wanted to know if we can see about changing Medication because Meloxicam is too expensive thru insurance.

## 2019-11-05 ENCOUNTER — APPOINTMENT (OUTPATIENT)
Dept: PHYSICAL THERAPY | Facility: MEDICAL CENTER | Age: 54
End: 2019-11-05
Attending: NURSE PRACTITIONER
Payer: COMMERCIAL

## 2019-11-12 ENCOUNTER — APPOINTMENT (OUTPATIENT)
Dept: PHYSICAL THERAPY | Facility: MEDICAL CENTER | Age: 54
End: 2019-11-12
Payer: COMMERCIAL

## 2019-11-15 ENCOUNTER — APPOINTMENT (OUTPATIENT)
Dept: PHYSICAL THERAPY | Facility: MEDICAL CENTER | Age: 54
End: 2019-11-15
Payer: COMMERCIAL

## 2019-11-19 ENCOUNTER — APPOINTMENT (OUTPATIENT)
Dept: PHYSICAL THERAPY | Facility: MEDICAL CENTER | Age: 54
End: 2019-11-19
Payer: COMMERCIAL

## 2019-11-22 ENCOUNTER — APPOINTMENT (OUTPATIENT)
Dept: PHYSICAL THERAPY | Facility: MEDICAL CENTER | Age: 54
End: 2019-11-22
Payer: COMMERCIAL

## 2019-11-22 NOTE — TELEPHONE ENCOUNTER
The patient has not been able to pick this up due to the price of the prescription, he would like this sent over to [facility] pharmacy because he can get a discounted price there, please send new prescription to [facility].        Was the patient seen in the last year in this department? Yes    Does patient have an active prescription for medications requested? No     Received Request Via: Patient

## 2019-11-26 ENCOUNTER — APPOINTMENT (OUTPATIENT)
Dept: PHYSICAL THERAPY | Facility: MEDICAL CENTER | Age: 54
End: 2019-11-26
Payer: COMMERCIAL

## 2019-12-03 ENCOUNTER — APPOINTMENT (OUTPATIENT)
Dept: PHYSICAL THERAPY | Facility: MEDICAL CENTER | Age: 54
End: 2019-12-03
Payer: COMMERCIAL

## 2020-01-02 ENCOUNTER — OFFICE VISIT (OUTPATIENT)
Dept: MEDICAL GROUP | Facility: MEDICAL CENTER | Age: 55
End: 2020-01-02
Payer: COMMERCIAL

## 2020-01-02 VITALS
SYSTOLIC BLOOD PRESSURE: 132 MMHG | RESPIRATION RATE: 16 BRPM | OXYGEN SATURATION: 95 % | BODY MASS INDEX: 35.17 KG/M2 | HEIGHT: 64 IN | TEMPERATURE: 97.7 F | HEART RATE: 90 BPM | WEIGHT: 206 LBS | DIASTOLIC BLOOD PRESSURE: 80 MMHG

## 2020-01-02 DIAGNOSIS — Z00.00 ANNUAL PHYSICAL EXAM: ICD-10-CM

## 2020-01-02 DIAGNOSIS — K21.9 GASTROESOPHAGEAL REFLUX DISEASE WITHOUT ESOPHAGITIS: ICD-10-CM

## 2020-01-02 DIAGNOSIS — R97.20 ELEVATED PSA: ICD-10-CM

## 2020-01-02 DIAGNOSIS — Z23 NEED FOR VACCINATION: ICD-10-CM

## 2020-01-02 DIAGNOSIS — E11.9 CONTROLLED TYPE 2 DIABETES MELLITUS WITHOUT COMPLICATION, WITHOUT LONG-TERM CURRENT USE OF INSULIN (HCC): ICD-10-CM

## 2020-01-02 DIAGNOSIS — M25.561 CHRONIC PAIN OF BOTH KNEES: ICD-10-CM

## 2020-01-02 DIAGNOSIS — G47.33 OSA (OBSTRUCTIVE SLEEP APNEA): ICD-10-CM

## 2020-01-02 DIAGNOSIS — G89.29 CHRONIC PAIN OF BOTH KNEES: ICD-10-CM

## 2020-01-02 DIAGNOSIS — E66.9 OBESITY (BMI 30-39.9): ICD-10-CM

## 2020-01-02 DIAGNOSIS — M25.562 CHRONIC PAIN OF BOTH KNEES: ICD-10-CM

## 2020-01-02 PROCEDURE — 90471 IMMUNIZATION ADMIN: CPT | Performed by: NURSE PRACTITIONER

## 2020-01-02 PROCEDURE — 90670 PCV13 VACCINE IM: CPT | Performed by: NURSE PRACTITIONER

## 2020-01-02 PROCEDURE — 99396 PREV VISIT EST AGE 40-64: CPT | Mod: 25 | Performed by: NURSE PRACTITIONER

## 2020-01-03 NOTE — PROGRESS NOTES
Chief Complaint   Patient presents with   • Annual Exam     Judah Hanna is a 54 y.o. male here for annual exam.  We discussed:    Chronic pain of both knees  Improved at this time, using meloxicam only as needed    Controlled type 2 diabetes mellitus without complication, without long-term current use of insulin ([facility])  Last a1c 6.4, no medications for this.  He has gained a few pounds in the last few months, due for repeat of labs.  Denies polyuria, polydipsia, numbness or tingling in extremities    Elevated PSA  Seen recently by urology, notes reviewed. He is planning for f/u in Oct 2020.  Father did have prostate cancer.  No increased difficulty with urination    Gastroesophageal reflux disease without esophagitis  Managed with prilosec as needed    JOSE (obstructive sleep apnea)  Admits to sporadic CPAP use.  Denies fatigue    Current medicines (including changes today)  Current Outpatient Medications   Medication Sig Dispense Refill   • Meloxicam 15 MG TABLET DISPERSIBLE Take 1 Tab by mouth 1 time daily as needed. 30 Tab 3   • Acetaminophen (ARTHRITIS PAIN RELIEF PO) Take  by mouth.     • ibuprofen (MOTRIN) 200 MG Tab Take 200 mg by mouth every 6 hours as needed.       No current facility-administered medications for this visit.      He  has a past medical history of Back pain, Chickenpox, Hyperlipidemia, Sleep apnea, and Type 2 diabetes, diet controlled ([facility]) (12/12/2018). He also has no past medical history of Asthma.  He  has a past surgical history that includes tonsillectomy.  Social History     Tobacco Use   • Smoking status: Former Smoker     Packs/day: 0.25     Years: 15.00     Pack years: 3.75     Types: Cigarettes   • Smokeless tobacco: Never Used   Substance Use Topics   • Alcohol use: Yes     Alcohol/week: 0.0 - 1.2 oz   • Drug use: No     Social History     Patient does not qualify to have social determinant information on file (likely too young).   Social History Narrative   • Not  "on file     Family History   Problem Relation Age of Onset   • Diabetes Mother    • Lung Disease Father    • Diabetes Sister    • Sleep Apnea Neg Hx      Family Status   Relation Name Status   • Mo  Alive   • Fa  Alive   • Sis  Alive   • Bro  Alive   • Arturo  Alive   • Arturo  Alive   • Arturo  Alive   • Son  Alive   • Son  Alive   • Neg Hx  (Not Specified)         ROS  Problems listed discussed above, all other systems reviewed and negative     Objective:     /80 (BP Location: Left arm, Patient Position: Sitting, BP Cuff Size: Adult)   Pulse 90   Temp 36.5 °C (97.7 °F) (Temporal)   Resp 16   Ht 1.626 m (5' 4\")   Wt 93.4 kg (206 lb)   SpO2 95%  Body mass index is 35.36 kg/m².  Physical Exam:  General: Alert, oriented in no acute distress.  Eye contact is good, speech is normal, affect calm  HEENT:  Oral mucosa pink moist, no lesions. Nares patent. TMs gray with good landmarks bilaterally. No cervical or supraclavicular lymphadenopathy, thyroid isthmus palpable without masses or nodules.  Lungs: clear to auscultation bilaterally, good aeration, normal effort. No wheeze/ rhonchi/ rales.  CV: regular rate and rhythm, S1, S2. No murmur, no JVD, no edema.  Pedal pulses 2 + bilaterally  Abdomen: soft, nontender, BS x4  Ext: color normal, vascularity normal, temperature normal. No rash or lesions.  Neuro: DTR 2+ bilaterally  Assessment and Plan:   The following treatment plan was discussed  1. Annual physical exam  Normal physical exam. General health and wellness discussion including healthy diet, regular exercise. Advised regular dental cleanings, eye exam yearly.     2. Chronic pain of both knees   continue as needed meloxicam which is working adequately.  He declines to pursue physical therapy at this point   3. Controlled type 2 diabetes mellitus without complication, without long-term current use of insulin ([facility])   last A1c 6.4.  Discussed weight loss, low-carb diet, increased exercise  MICROALBUMIN CREAT RATIO " URINE   4. Elevated PSA   patient to continue follow-up with urology   5. Gastroesophageal reflux disease without esophagitis   continue as needed omeprazole   6. JOSE (obstructive sleep apnea)   encourage more consistency with CPAP   7. Obesity (BMI 30-39.9)  Patient identified as having weight management issue.  Appropriate orders and counseling given.   8. Need for vaccination  Pneumococcal Conjugate Vaccine 13-Valent         Followup: pending labs             Please note that this dictation was created using voice recognition software. I have worked with consultants from the vendor as well as technical experts from [employer] to optimize the interface. I have made every reasonable attempt to correct obvious errors, but I expect that there are errors of grammar and possibly content that I did not discover before finalizing the note.

## 2020-01-03 NOTE — ASSESSMENT & PLAN NOTE
Seen recently by urology, notes reviewed. He is planning for f/u in Oct 2020.  Father did have prostate cancer.  No increased difficulty with urination

## 2020-01-17 LAB
ALBUMIN SERPL-MCNC: 4.5 G/DL (ref 3.5–5.5)
ALBUMIN/CREAT UR: 11.1 MG/G CREAT (ref 0–30)
ALBUMIN/GLOB SERPL: 1.3 {RATIO} (ref 1.2–2.2)
ALP SERPL-CCNC: 77 IU/L (ref 39–117)
ALT SERPL-CCNC: 47 IU/L (ref 0–44)
AST SERPL-CCNC: 23 IU/L (ref 0–40)
BILIRUB SERPL-MCNC: 0.9 MG/DL (ref 0–1.2)
BUN SERPL-MCNC: 16 MG/DL (ref 6–24)
BUN/CREAT SERPL: 16 (ref 9–20)
CALCIUM SERPL-MCNC: 9.5 MG/DL (ref 8.7–10.2)
CHLORIDE SERPL-SCNC: 102 MMOL/L (ref 96–106)
CHOLEST SERPL-MCNC: 171 MG/DL (ref 100–199)
CO2 SERPL-SCNC: 22 MMOL/L (ref 20–29)
CREAT SERPL-MCNC: 1 MG/DL (ref 0.76–1.27)
CREAT UR-MCNC: 127.6 MG/DL
GLOBULIN SER CALC-MCNC: 3.4 G/DL (ref 1.5–4.5)
GLUCOSE SERPL-MCNC: 132 MG/DL (ref 65–99)
HBA1C MFR BLD: 7.3 % (ref 4.8–5.6)
HDLC SERPL-MCNC: 49 MG/DL
LABORATORY COMMENT REPORT: NORMAL
LDLC SERPL CALC-MCNC: 98 MG/DL (ref 0–99)
MICROALBUMIN UR-MCNC: 14.2 UG/ML
POTASSIUM SERPL-SCNC: 3.9 MMOL/L (ref 3.5–5.2)
PROT SERPL-MCNC: 7.9 G/DL (ref 6–8.5)
SODIUM SERPL-SCNC: 141 MMOL/L (ref 134–144)
TRIGL SERPL-MCNC: 119 MG/DL (ref 0–149)
VLDLC SERPL CALC-MCNC: 24 MG/DL (ref 5–40)

## 2020-03-19 ENCOUNTER — OFFICE VISIT (OUTPATIENT)
Dept: MEDICAL GROUP | Facility: MEDICAL CENTER | Age: 55
End: 2020-03-19
Payer: COMMERCIAL

## 2020-03-19 ENCOUNTER — APPOINTMENT (OUTPATIENT)
Dept: MEDICAL GROUP | Facility: MEDICAL CENTER | Age: 55
End: 2020-03-19
Payer: COMMERCIAL

## 2020-03-19 VITALS
HEART RATE: 88 BPM | DIASTOLIC BLOOD PRESSURE: 78 MMHG | HEIGHT: 64 IN | WEIGHT: 205.03 LBS | SYSTOLIC BLOOD PRESSURE: 124 MMHG | RESPIRATION RATE: 16 BRPM | OXYGEN SATURATION: 94 % | TEMPERATURE: 99.3 F | BODY MASS INDEX: 35 KG/M2

## 2020-03-19 DIAGNOSIS — D31.91: ICD-10-CM

## 2020-03-19 DIAGNOSIS — H57.89 EYE IRRITATION: ICD-10-CM

## 2020-03-19 DIAGNOSIS — M54.9 MUSCULOSKELETAL BACK PAIN: ICD-10-CM

## 2020-03-19 PROCEDURE — 99213 OFFICE O/P EST LOW 20 MIN: CPT | Performed by: NURSE PRACTITIONER

## 2020-03-19 RX ORDER — MELOXICAM 15 MG/1
15 TABLET ORAL DAILY
Qty: 30 TAB | Refills: 5 | Status: SHIPPED | OUTPATIENT
Start: 2020-03-19 | End: 2020-09-03

## 2020-03-19 ASSESSMENT — PATIENT HEALTH QUESTIONNAIRE - PHQ9: CLINICAL INTERPRETATION OF PHQ2 SCORE: 0

## 2020-03-19 NOTE — ASSESSMENT & PLAN NOTE
Pt has had growth on the lateral sclera right eye for quite some time. He had been seen by ophthalmology last year and was told this was not a concern. He now feels that it is growing and beginning to cause discomfort. He feels it is contributing to muscle fatigue in the eye. A few days ago he had woken up with redness in the R eye and slight irritation. This began improving the next day. He feels his vision is slowing worsening.  No eye discharge, crusting, acute pain, eyelid swelling

## 2020-03-19 NOTE — PROGRESS NOTES
"Subjective:     Chief Complaint   Patient presents with   • Eye Problem     X3 Days Right eye(patient woke up with it red)   • Medication Refill     Meloxicam     Judah Hanna is a 54 y.o. male here today to follow up on:    Benign growth on outer layer of eye, right  Pt has had growth on the lateral sclera right eye for quite some time. He had been seen by ophthalmology last year and was told this was not a concern. He now feels that it is growing and beginning to cause discomfort. He feels it is contributing to muscle fatigue in the eye. A few days ago he had woken up with redness in the R eye and slight irritation. This began improving the next day. He feels his vision is slowing worsening.  No eye discharge, crusting, acute pain, eyelid swelling     He would like refill of meloxicam which has been helpful for his back pain. No GI upset with medication. No CKD    Current medicines (including changes today)  Current Outpatient Medications   Medication Sig Dispense Refill   • meloxicam (MOBIC) 15 MG tablet Take 1 Tab by mouth every day. 30 Tab 5   • Meloxicam 15 MG TABLET DISPERSIBLE Take 1 Tab by mouth 1 time daily as needed. 30 Tab 3   • Acetaminophen (ARTHRITIS PAIN RELIEF PO) Take  by mouth.     • ibuprofen (MOTRIN) 200 MG Tab Take 200 mg by mouth every 6 hours as needed.       No current facility-administered medications for this visit.      He  has a past medical history of Back pain, Chickenpox, Hyperlipidemia, Sleep apnea, and Type 2 diabetes, diet controlled ([facility]) (12/12/2018). He also has no past medical history of Asthma.    ROS included above     Objective:     /78   Pulse 88   Temp 37.4 °C (99.3 °F) (Temporal)   Resp 16   Ht 1.626 m (5' 4\")   Wt 93 kg (205 lb 0.4 oz)   SpO2 94%  Body mass index is 35.19 kg/m².     Physical Exam:  General: Alert, oriented in no acute distress.  Eye contact is good, speech is normal, affect calm  HEENT: sclera and conjunctiva clear " bilaterally. R eye with cystic-like growth. No eye discharge, no eyelid swelling  Lungs: clear to auscultation bilaterally, normal effort, no wheeze/ rhonchi/ rales.  CV: regular rate and rhythm, S1, S2, no murmur  Ext: no edema, color normal, vascularity normal, temperature normal    Assessment and Plan:   The following treatment plan was discussed   1. Eye irritation  I am uncertain of exact diagnosis for the growth in the R eye but he is now feeling it is increasing in size and causing discomfort. Will refer back to specialist for reevaluation. No evidence of infection today  REFERRAL TO OPHTHALMOLOGY   2. Musculoskeletal back pain  Medication has been helpful, refill provided  meloxicam (MOBIC) 15 MG tablet   3. Benign growth on outer layer of eye, right         Followup: as needed          Please note that this dictation was created using voice recognition software. I have worked with consultants from the vendor as well as technical experts from [facility] to optimize the interface. I have made every reasonable attempt to correct obvious errors, but I expect that there are errors of grammar and possibly content that I did not discover before finalizing the note.

## 2020-07-23 NOTE — TELEPHONE ENCOUNTER
HEMATOLOGY / ONCOLOGY CONSULT NOTE    Name:  Melo Peña  YOB: 1944  MRN:  [id number]  CSN:  [id number]    PCP:  Mao Hall MD  Requesting Physician:  Mao Hall MD  Patient Care Team:  Mao Hall MD as PCP - General (Internal Medicine)      REASON FOR CONSULT:  \"anemia, leukopenia\"      HISTORY OF PRESENT ILLNESS:  Melo Peña is a 75 year old male from [place], [place] seen today for bictyopenia - leukopenia and anemia.  PMH includes dementia and stroke.    Patient referred for bictyopenia - leukopenia and anemia.    Seen with POA wife. She is a retired RN.  Patient defers to his wife.  +19# weight loss over 6 months    Was on B12 in past.    6/8 labs  WBC 3.4 <- 4.0 <-<- 4.1 (06/4/18)  ANC 2000  Low monocytes otherwise normal differential    Hgb 12.9 <- 13.8  Plt 202    Crt 1.56 <- 1.62    Will check labs    07/23 CBC  WBC 3.5, ANC 2000  Hgb 13.3 (normal), MCV 77.6 (so ? Early ROSANGELA?)  Plt 172    Discussed DDx includes bone marrow disorders.  Possible a chronic bone marrow issue given 2 years of mild leukopenia.  Or could be constitutional leukopenia.  I don't see [facility] from [facility] in CareEverywhere.    Discussed current labs and pending labs.       REVIEW OF SYSTEMS:  See RN/MA note in electronic medical record (EMR).    +stroke ~6 years ago  dementia       Past Medical History:   Diagnosis Date   • CVA (cerebral vascular accident) (CMS/HCC) 2015   • Uncomplicated senile dementia (CMS/HCC)      Past Surgical History:   Procedure Laterality Date   • Eye exam Bilateral 06/14/2018    [facility]   • Hernia repair         Social History     Tobacco Use   • Smoking status: Former Smoker     Packs/day: 0.00   • Smokeless tobacco: Never Used   Substance Use Topics   • Alcohol use: Yes     Alcohol/week: 1.0 standard drinks     Types: 1 Glasses of wine per week     Frequency: 2-4 times a month     Binge frequency: Never     Comment: Maybe once monthly.   • Drug use: No  Please advise.       Family History   Problem Relation Age of Onset   • Dementia/Alzheimers Mother        HEALTH MAINTENANCE:  1.  Living will:  Advance Directive Status:  Yes    Code Status:    2.  Lipid testing:    CHOLESTEROL (mg/dL)   Date Value   2019 181     Cholesterol (mg/dL)   Date Value   2020 160       3.  Cardiovascular testing:    ECG:  No results found for this or any previous visit.   Echo:    Results for orders placed or performed in visit on 18   ECHO M-MODE/2D/DOPPLER (ROUTINE)   Result Value Ref Range    Left Ventricular Internal Dimension in Diastole 3.8 cm    Ejection Fraction 75.0 %    Left Internal Dimenson in Systole 2.6 cm    Interventricular Septum in End Diastole 2.0 cm    Left ventricle end diastolic posterior wall thickness 2D  1.5 cm    Ascending Aorta 4.5 cm    LVOT 2D 2.3 cm    DOP Calc LVOT Peak Esteban 1.6 m/s    MV E Tissue Esteban Med 7.7 cm/s    LVOT VTI 37.7 cm    MV E Wave Esteban/E Tissue Esteban Med 8.9     RV Tissue Doppler Free Wall Heart Rate 0.1 m/s    Tricuspid Valve Peak Regurgitation Velocity 2.4 m/s    MV Peak E Velocity 0.7 m/s    MV Peak A Velocity 0.8 m/s    TV Estimated Right Arterial Pressure 8.0 mmHg    E Wave Decelaration Time 231.5 ms    Est Right Vent Systolic Pressure by Tricuspid Regurgitation Jet 30.7 mmHg    MV E Tissue Esteban Lat 9.4 cm/s       4.  Colorectal/Gastrointestinal screenin.  Bone mineral density (BMD) testing:          and/ or FRAX Calculation:  6.  Vitamin D-25OH testing:    VITAMIND, 25 HYDROXY (ng/mL)   Date Value   2018 46.9     Vitamin D, 25-Hydroxy (ng/mL)   Date Value   2020 52.9       7.  Immunizations:    Most Recent Immunizations   Administered Date(s) Administered   • Pneumococcal Conjugate 13 valent 2015   • Pneumococcal polysaccharide, adult, 23 valent 2018     8. Dentist?:                                 Dental problems:  Panorex:    9. TSH testing:    TSH (mcUnits/mL)   Date Value   2020 0.977   2019  1.099      10. PSA:    PSA, Total (ng/mL)   Date Value   06/04/2018 1.49     11. Abdominal aortic aneurysm (AAA) ultrasound (US) screen:  [1x b/w age 65-75 who ever smoked]     Spiritual beliefs that would influence care:        ALLERGIES:   ALLERGIES:   Allergen Reactions   • Sertraline Hcl Other (See Comments)     Nausea vomiting   hallucinations   • Lisinopril Cough       MEDICATIONS:   Current Outpatient Medications   Medication Sig   • amLODIPine (NORVASC) 5 MG tablet Take 0.5 tablets by mouth daily.   • carvedilol (COREG) 12.5 MG tablet Take 1 tablet by mouth 2 times daily (with meals).   • hydrochlorothiazide (HYDRODIURIL) 12.5 MG tablet Take 1 tablet by mouth daily.   • rosuvastatin (CRESTOR) 5 MG tablet Take 0.5 tablets by mouth daily.   • cilostazol (PLETAL) 50 MG tablet Take 1 tablet by mouth 2 times daily.   • aspirin 81 MG tablet Take 81 mg by mouth daily.   • Cholecalciferol (VITAMIN D) 2000 units capsule Take 2,000 Units by mouth daily.   • Lactobacillus (PROBIOTIC ACIDOPHILUS PO) Take 1 tablet by mouth daily.     Current Facility-Administered Medications   Medication   • cyanocobalamin injection 1,000 mcg         PHYSICAL EXAM:    VITALS:   Oncology Encounter Vitals [07/23/20 1404]   ONC OP Encounter Vitals Group      /71      Heart Rate (!) 53      Resp 18      Temp 97.4 °F (36.3 °C)      Temp src Oral      SpO2       Weight 194 lb 10.7 oz (88.3 kg)      Height       Pain Score  0      Pain Location       Pain Education?       BSA (Calculated - m2) - [employer]       BMI (Calculated)        ECOG Performance Status   ECOG [07/23/20 1405]   ECOG Performance Status 2             PERFORMANCE STATUS:  See EMR  PAIN:  As above  GENERAL:  Awake. Not talking   H/o dementia and s/p stroke  HEENT:  Normocephalic and atraumatic.     Pupils are equal, round, reactive to accommodation and light (PERRLA).    Mask  LYMPH NODES:  No cervical, supraclavicular, infraclavicular lymphadenopathy.  LUNGS:   Clear to auscultation bilaterally.     No wheezes, rales or rhonchi noted.  Nonlabored respirations.   HEART:  Regular rate and rhythm.     Normal S1 and S2.  No murmurs, rubs or gallops.  ABDOMEN:  Soft, nontender, nondistended.     No organomegaly or masses noted.  EXTREMITIES:  Trace LE ankle edema.    NEUROLOGIC:   Not talking   H/o dementia and s/p stroke  SKIN:  No skin lesions.      STUDIES:     CBC  Lab Results   Component Value Date/Time    WBC 3.4 (L) 06/08/2020 12:42 PM    WBC 4.0 (L) 12/03/2019 12:19 PM    HGB 12.9 (L) 06/08/2020 12:42 PM    HGB 13.8 12/03/2019 12:19 PM    HCT 38.9 (L) 06/08/2020 12:42 PM    HCT 42.2 12/03/2019 12:19 PM     06/08/2020 12:42 PM     12/03/2019 12:19 PM          OTHER LABS  No visits with results within 1 Day(s) from this visit.   Latest known visit with results is:   Lab Services on 06/08/2020   Component Date Value Ref Range Status   • Hemoglobin A1C 06/08/2020 5.9* 4.5 - 5.6 % Final   • Fasting Status 06/08/2020 12  Hours Final   • Cholesterol 06/08/2020 160  <=199 mg/dL Final   • Triglycerides 06/08/2020 58  <=149 mg/dL Final   • HDL 06/08/2020 62  >=40 mg/dL Final   • LDL 06/08/2020 86  <=129 mg/dL Final   • Non-HDL Cholesterol 06/08/2020 98  mg/dL Final   • Cholesterol/ HDL Ratio 06/08/2020 2.6  <=4.4 Final   • Fasting Status 06/08/2020 12  Hours Final   • Sodium 06/08/2020 142  135 - 145 mmol/L Final   • Potassium 06/08/2020 3.4  3.4 - 5.1 mmol/L Final   • Chloride 06/08/2020 111* 98 - 107 mmol/L Final   • Carbon Dioxide 06/08/2020 25  21 - 32 mmol/L Final   • Anion Gap 06/08/2020 9* 10 - 20 mmol/L Final   • Glucose 06/08/2020 115* 65 - 99 mg/dL Final   • BUN 06/08/2020 17  6 - 20 mg/dL Final   • Creatinine 06/08/2020 1.56* 0.67 - 1.17 mg/dL Final   • Glomerular Filtration Rate 06/08/2020 50* >90 Final   • BUN/ Creatinine Ratio 06/08/2020 11  7 - 25 Final   • Bilirubin, Total 06/08/2020 0.9  0.2 - 1.0 mg/dL Final   • GOT/AST 06/08/2020 7  <=37 Units/L  Final   • Alkaline Phosphatase 06/08/2020 75  45 - 117 Units/L Final   • Albumin 06/08/2020 3.7  3.6 - 5.1 g/dL Final   • Protein, Total 06/08/2020 7.2  6.4 - 8.2 g/dL Final   • Globulin 06/08/2020 3.5  2.0 - 4.0 g/dL Final   • A/G Ratio 06/08/2020 1.1  1.0 - 2.4 Final   • GPT/ALT 06/08/2020 8  <64 Units/L Final   • Calcium 06/08/2020 9.5  8.4 - 10.2 mg/dL Final   • TSH 06/08/2020 0.977  0.350 - 5.000 mcUnits/mL Final   • WBC 06/08/2020 3.4* 4.2 - 11.0 K/mcL Final   • RBC 06/08/2020 4.90  4.50 - 5.90 mil/mcL Final   • HGB 06/08/2020 12.9* 13.0 - 17.0 g/dL Final   • HCT 06/08/2020 38.9* 39.0 - 51.0 % Final   • MCV 06/08/2020 79.4  78.0 - 100.0 fl Final   • MCH 06/08/2020 26.3  26.0 - 34.0 pg Final   • MCHC 06/08/2020 33.2  32.0 - 36.5 g/dL Final   • RDW-CV 06/08/2020 13.8  11.0 - 15.0 % Final   • PLT 06/08/2020 202  140 - 450 K/mcL Final   • NRBC 06/08/2020 0  <=0 /100 WBC Final   • Neutrophil, Percent 06/08/2020 58  % Final   • Lymphocytes, Percent 06/08/2020 31  % Final   • Mono, Percent 06/08/2020 6  % Final   • Eosinophils, Percent 06/08/2020 4  % Final   • Basophils, Percent 06/08/2020 1  % Final   • Immature Granulocytes 06/08/2020 0  % Final   • Absolute Neutrophils 06/08/2020 2.0  1.8 - 7.7 K/mcL Final   • Absolute Lymphocytes 06/08/2020 1.1  1.0 - 4.0 K/mcL Final   • Absolute Monocytes 06/08/2020 0.2* 0.3 - 0.9 K/mcL Final   • Absolute Eosinophils  06/08/2020 0.1  0.1 - 0.5 K/mcL Final   • Absolute Basophils 06/08/2020 0.0  0.0 - 0.3 K/mcL Final   • Absolute Immmature Granulocytes 06/08/2020 0.0  0.0 - 0.2 K/mcL Final   • RDW-SD 06/08/2020 39.8  39.0 - 50.0 fL Final   • Ferritin 06/08/2020 94  26 - 388 ng/mL Final   • Iron 06/08/2020 67  65 - 175 mcg/dL Final   • Iron Binding Capacity 06/08/2020 209* 250 - 450 mcg/dL Final   • Iron, Percent Saturation 06/08/2020 32  15 - 45 % Final   • Vitamin D, 25-Hydroxy 06/08/2020 52.9  30.0 - 100.0 ng/mL Final   • Vitamin B12 06/08/2020 1,469* 211 - 911 pg/mL Final   •  Folate 06/08/2020 6.3  >=5.5 ng/mL Final         No results found for this or any previous visit.      IMAGING: (selected)    07/23/20 XR Hip  IMPRESSION:   1.  No fracture or malalignment.  2.  Mild osteoarthritis of the bilateral hips.  3.  Calcification near the left greater trochanter and near the bilateral anterior superior iliac spines may be due to prior injury or calcific tendinitis.      IMPRESSION AND PLAN:   Melo Peña is a 75 year old male with bictyopenia - leukopenia and anemia.      1. Bictyopenia - leukopenia and anemia    DIAGNOSIS:  Anemia, normocytic, mild  ETIOLOGY(-IES):  CKD, h/o B12 deficiency       CURRENT TREATMENT:    PAST TREATMENT:  H/o B12 injections    Iron Studies:  Recent Labs   Lab 06/08/20  1242   Iron 67   Iron Binding Capacity 209*   Iron, Percent Saturation 32   Ferritin 94         Ref. Range 6/4/2018 14:31 6/6/2019 11:10 6/8/2020 12:42   B12 Latest Ref Range: 211 - 911 pg/mL 256 623 1,469 (H)         Ref. Range 6/6/2019 11:10 6/8/2020 12:42   FOLATE Latest Ref Range: >=5.5 ng/mL 7.7 6.3     I don't see vitamin deficiencies documented in our EMR    Leukopenia is chronic for at least 2 years    DDx includes MDS, MF, MPN, chronic myelogenous leukemia, etc.    2. Research screening  Pending    [facility]    3. Prophylaxis  A. Gastrointestinal (GI):    B. Deep Vein Thrombosis (DVT):  ASA, Pletal  C. Infectious Disease (ID):    E. Bone:  VitD 2000 U/d  F. Pulmonary:    F. Sleep:    G. Tumor Lysis Syndrome (TLS):    H. Cardiology:    4. Follow up  FU w/ Labs 4 weeks

## 2020-08-11 ENCOUNTER — OFFICE VISIT (OUTPATIENT)
Dept: MEDICAL GROUP | Facility: MEDICAL CENTER | Age: 55
End: 2020-08-11
Payer: COMMERCIAL

## 2020-08-11 ENCOUNTER — HOSPITAL ENCOUNTER (OUTPATIENT)
Facility: MEDICAL CENTER | Age: 55
End: 2020-08-11
Attending: NURSE PRACTITIONER
Payer: COMMERCIAL

## 2020-08-11 VITALS
RESPIRATION RATE: 18 BRPM | BODY MASS INDEX: 33.59 KG/M2 | SYSTOLIC BLOOD PRESSURE: 138 MMHG | TEMPERATURE: 98.6 F | HEIGHT: 63 IN | WEIGHT: 189.6 LBS | OXYGEN SATURATION: 96 % | HEART RATE: 98 BPM | DIASTOLIC BLOOD PRESSURE: 86 MMHG

## 2020-08-11 DIAGNOSIS — E11.65 UNCONTROLLED TYPE 2 DIABETES MELLITUS WITH HYPERGLYCEMIA (HCC): ICD-10-CM

## 2020-08-11 DIAGNOSIS — R35.89 POLYURIA: ICD-10-CM

## 2020-08-11 LAB — POC HEMOGLOBIN: 11.8

## 2020-08-11 PROCEDURE — 85018 HEMOGLOBIN: CPT | Performed by: NURSE PRACTITIONER

## 2020-08-11 PROCEDURE — 81003 URINALYSIS AUTO W/O SCOPE: CPT

## 2020-08-11 PROCEDURE — 99214 OFFICE O/P EST MOD 30 MIN: CPT | Performed by: NURSE PRACTITIONER

## 2020-08-11 RX ORDER — METFORMIN HYDROCHLORIDE 500 MG/1
TABLET, EXTENDED RELEASE ORAL
Qty: 120 TAB | Refills: 5 | Status: SHIPPED | OUTPATIENT
Start: 2020-08-11 | End: 2021-03-22 | Stop reason: SDUPTHER

## 2020-08-12 DIAGNOSIS — R35.89 POLYURIA: ICD-10-CM

## 2020-08-12 LAB
APPEARANCE UR: CLEAR
BILIRUB UR QL STRIP.AUTO: NEGATIVE
COLOR UR: YELLOW
GLUCOSE UR STRIP.AUTO-MCNC: >=1000 MG/DL
KETONES UR STRIP.AUTO-MCNC: NEGATIVE MG/DL
LEUKOCYTE ESTERASE UR QL STRIP.AUTO: NEGATIVE
MICRO URNS: ABNORMAL
NITRITE UR QL STRIP.AUTO: NEGATIVE
PH UR STRIP.AUTO: 5.5 [PH] (ref 5–8)
PROT UR QL STRIP: NEGATIVE MG/DL
RBC UR QL AUTO: NEGATIVE
SP GR UR STRIP.AUTO: 1.04
UROBILINOGEN UR STRIP.AUTO-MCNC: 0.2 MG/DL

## 2020-08-13 ENCOUNTER — APPOINTMENT (OUTPATIENT)
Dept: MEDICAL GROUP | Facility: MEDICAL CENTER | Age: 55
End: 2020-08-13
Payer: COMMERCIAL

## 2020-08-13 ENCOUNTER — TELEPHONE (OUTPATIENT)
Dept: MEDICAL GROUP | Facility: MEDICAL CENTER | Age: 55
End: 2020-08-13

## 2020-08-13 DIAGNOSIS — E11.65 UNCONTROLLED TYPE 2 DIABETES MELLITUS WITH HYPERGLYCEMIA (HCC): ICD-10-CM

## 2020-08-13 NOTE — TELEPHONE ENCOUNTER
PCP: Modesta Ramírez    Patient states he was seen a few days ago. At that time Modesta asked if he wanted to get his eyes checked again for the Diabetes Management. He has decided he wants another opinion and now would like a referral for the exam. Thank you.

## 2020-08-13 NOTE — ASSESSMENT & PLAN NOTE
Patient presents today with concerns of excessive urination and thirst.  States that his brother was visiting and had a glucometer, checked his glucose and it was over 400.  He has been prediabetic in the past, last A1c in January of this year was 7.3.  He has been reluctant to take medication and was planning to focus on diet and exercise regimen but admits that he has not really made any changes in the last few months.  He has been eating poorly, not exercising.  In the last month he has lost about 10 pounds unintentionally.  A1c checked in the office today, significantly elevated at 11.8.  Denies nausea, headache, blurred vision, numbness or tingling in extremities

## 2020-08-13 NOTE — PROGRESS NOTES
Subjective:     Chief Complaint   Patient presents with   • Urinary Frequency     drinking  lots of fluids and frequent urination   • Weight Loss   • Other     blood sugar was at 500   • Eye Problem     worsening      Judah Hanna is a 55 y.o. male here today to follow up on:    Uncontrolled type 2 diabetes mellitus with hyperglycemia (HCC)  Patient presents today with concerns of excessive urination and thirst.  States that his brother was visiting and had a glucometer, checked his glucose and it was over 400.  He has been prediabetic in the past, last A1c in January of this year was 7.3.  He has been reluctant to take medication and was planning to focus on diet and exercise regimen but admits that he has not really made any changes in the last few months.  He has been eating poorly, not exercising.  In the last month he has lost about 10 pounds unintentionally.  A1c checked in the office today, significantly elevated at 11.8.  Denies nausea, headache, blurred vision, numbness or tingling in extremities       Current medicines (including changes today)  Current Outpatient Medications   Medication Sig Dispense Refill   • metFORMIN ER (GLUCOPHAGE XR) 500 MG TABLET SR 24 HR Start 1 tab PO BID with meals gradually increase to 2 tabs PO BID with meals 120 Tab 5   • Acetaminophen (ARTHRITIS PAIN RELIEF PO) Take  by mouth.     • ibuprofen (MOTRIN) 200 MG Tab Take 200 mg by mouth every 6 hours as needed.     • meloxicam (MOBIC) 15 MG tablet Take 1 Tab by mouth every day. (Patient not taking: Reported on 8/11/2020) 30 Tab 5   • Meloxicam 15 MG TABLET DISPERSIBLE Take 1 Tab by mouth 1 time daily as needed. (Patient not taking: Reported on 8/11/2020) 30 Tab 3     No current facility-administered medications for this visit.      He  has a past medical history of Back pain, Chickenpox, Hyperlipidemia, Sleep apnea, and Type 2 diabetes, diet controlled (HCC) (12/12/2018). He also has no past medical history of  "Asthma.    ROS included above     Objective:     /86 (BP Location: Left arm, Patient Position: Sitting, BP Cuff Size: Adult)   Pulse 98   Temp 37 °C (98.6 °F) (Temporal)   Resp 18   Ht 1.6 m (5' 3\")   Wt 86 kg (189 lb 9.5 oz)   SpO2 96%  Body mass index is 33.59 kg/m².     Physical Exam:  General: Alert, oriented in no acute distress.  Eye contact is good, speech is normal, affect calm.  Lungs: clear to auscultation bilaterally, normal effort, no wheeze/ rhonchi/ rales.  CV: regular rate and rhythm, S1, S2, no murmur  Abdomen: soft, nontender, no hepatosplenomegaly  Ext: no edema, color normal, vascularity normal, temperature normal    Assessment and Plan:   The following treatment plan was discussed   1. Uncontrolled type 2 diabetes mellitus with hyperglycemia (HCC)   A1c significantly elevated over 11 in the office today.  He is experiencing polyuria as well as weight loss.  Treatment options discussed, we will start trial of metformin with gradual titrate to 2000 mg daily.  He is motivated to work on diet and lifestyle change, recommendations reviewed.  In office follow-up in 3 months with labs prior, he is to contact me in the meantime with any concerns  metFORMIN ER (GLUCOPHAGE XR) 500 MG TABLET SR 24 HR    HEMOGLOBIN A1C    Comp Metabolic Panel    Lipid Profile   2. Polyuria  URINALYSIS    POCT Hemoglobin       Followup: 3 months         Please note that this dictation was created using voice recognition software. I have worked with consultants from the vendor as well as technical experts from [employer] to optimize the interface. I have made every reasonable attempt to correct obvious errors, but I expect that there are errors of grammar and possibly content that I did not discover before finalizing the note.       "

## 2020-09-03 ENCOUNTER — OFFICE VISIT (OUTPATIENT)
Dept: URGENT CARE | Facility: CLINIC | Age: 55
End: 2020-09-03
Payer: COMMERCIAL

## 2020-09-03 ENCOUNTER — APPOINTMENT (OUTPATIENT)
Dept: RADIOLOGY | Facility: IMAGING CENTER | Age: 55
End: 2020-09-03
Attending: PHYSICIAN ASSISTANT
Payer: COMMERCIAL

## 2020-09-03 VITALS
TEMPERATURE: 98.8 F | HEART RATE: 99 BPM | OXYGEN SATURATION: 100 % | HEIGHT: 62 IN | RESPIRATION RATE: 16 BRPM | DIASTOLIC BLOOD PRESSURE: 76 MMHG | BODY MASS INDEX: 34.78 KG/M2 | WEIGHT: 189 LBS | SYSTOLIC BLOOD PRESSURE: 140 MMHG

## 2020-09-03 DIAGNOSIS — S99.912A INJURY OF LEFT ANKLE, INITIAL ENCOUNTER: ICD-10-CM

## 2020-09-03 DIAGNOSIS — S93.402A SPRAIN OF LEFT ANKLE, UNSPECIFIED LIGAMENT, INITIAL ENCOUNTER: ICD-10-CM

## 2020-09-03 PROCEDURE — 99214 OFFICE O/P EST MOD 30 MIN: CPT | Performed by: PHYSICIAN ASSISTANT

## 2020-09-03 PROCEDURE — 73610 X-RAY EXAM OF ANKLE: CPT | Mod: TC,FY,LT | Performed by: PHYSICIAN ASSISTANT

## 2020-09-03 ASSESSMENT — ENCOUNTER SYMPTOMS
FEVER: 0
CHILLS: 0
SORE THROAT: 0
PALPITATIONS: 0
TINGLING: 0
SHORTNESS OF BREATH: 0
VOMITING: 0
SENSORY CHANGE: 0
BLURRED VISION: 0
NAUSEA: 0

## 2020-09-03 ASSESSMENT — PAIN SCALES - GENERAL: PAINLEVEL: 7=MODERATE-SEVERE PAIN

## 2020-09-03 NOTE — PATIENT INSTRUCTIONS
Ankle Sprain    An ankle sprain is a stretch or tear in a ligament in the ankle. Ligaments are tissues that connect bones to each other.  The two most common types of ankle sprains are:  · Inversion sprain. This happens when the foot turns inward and the ankle rolls outward. It affects the ligament on the outside of the foot (lateral ligament).  · Eversion sprain. This happens when the foot turns outward and the ankle rolls inward. It affects the ligament on the inner side of the foot (medial ligament).  What are the causes?  This condition is often caused by accidentally rolling or twisting the ankle.  What increases the risk?  You are more likely to develop this condition if you play sports.  What are the signs or symptoms?  Symptoms of this condition include:  · Pain in your ankle.  · Swelling.  · Bruising. This may develop right after you sprain your ankle or 1-2 days later.  · Trouble standing or walking, especially when you turn or change directions.  How is this diagnosed?  This condition is diagnosed with:  · A physical exam. During the exam, your health care provider will press on certain parts of your foot and ankle and try to move them in certain ways.  · X-ray imaging. These may be taken to see how severe the sprain is and to check for broken bones.  How is this treated?  This condition may be treated with:  · A brace or splint. This is used to keep the ankle from moving until it heals.  · An elastic bandage. This is used to support the ankle.  · Crutches.  · Pain medicine.  · Surgery. This may be needed if the sprain is severe.  · Physical therapy. This may help to improve the range of motion in the ankle.  Follow these instructions at home:  If you have a brace or a splint:  · Wear the brace or splint as told by your health care provider. Remove it only as told by your health care provider.  · Loosen the brace or splint if your toes tingle, become numb, or turn cold and blue.  · Keep the brace or  splint clean.  · If the brace or splint is not waterproof:  ? Do not let it get wet.  ? Cover it with a watertight covering when you take a bath or a shower.  If you have an elastic bandage (dressing):  · Remove it to shower or bathe.  · Try not to move your ankle much, but wiggle your toes from time to time. This helps to prevent swelling.  · Adjust the dressing to make it more comfortable if it feels too tight.  · Loosen the dressing if you have numbness or tingling in your foot, or if your foot becomes cold and blue.  Managing pain, stiffness, and swelling    · Take over-the-counter and prescription medicines only as told by your health care provider.  · For 2-3 days, keep your ankle raised (elevated) above the level of your heart as much as possible.  · If directed, put ice on the injured area:  ? If you have a removable brace or splint, remove it as told by your health care provider.  ? Put ice in a plastic bag.  ? Place a towel between your skin and the bag.  ? Leave the ice on for 20 minutes, 2-3 times a day.  General instructions  · Rest your ankle.  · Do not use the injured limb to support your body weight until your health care provider says that you can. Use crutches as told by your health care provider.  · Do not use any products that contain nicotine or tobacco, such as cigarettes, e-cigarettes, and chewing tobacco. If you need help quitting, ask your health care provider.  · Keep all follow-up visits as told by your health care provider. This is important.  Contact a health care provider if:  · You have rapidly increasing bruising or swelling.  · Your pain is not relieved with medicine.  Get help right away if:  · Your foot or toes become numb or blue.  · You have severe pain that gets worse.  Summary  · An ankle sprain is a stretch or tear in a ligament in the ankle. Ligaments are tissues that connect bones to each other.  · This condition is often caused by accidentally rolling or twisting the  ankle.  · Symptoms include pain, swelling, bruising, and trouble walking.  · To relieve pain and swelling, put ice on the affected ankle, raise your ankle above the level of your heart, and use an elastic bandage.  · Keep all follow-up visits as told by your health care provider. This is important.  This information is not intended to replace advice given to you by your health care provider. Make sure you discuss any questions you have with your health care provider.  Document Released: 12/18/2006 Document Revised: 09/09/2019 Document Reviewed: 05/14/2019  Elsevier Patient Education © 2020 [employer]

## 2020-09-03 NOTE — PROGRESS NOTES
Subjective:      Judah Hanna is a 55 y.o. male who presents with Ankle Sprain (left ankle x 1 day )    HPI:  This is a new problem. Judah Hanna is a 55 y.o. male who presents today for evaluation of left ankle pain.  Patient states that yesterday he was carrying a little blankets in his arms and did not see the end of the concrete.  Said that he misstepped and his ankle rolled inward underneath of him.  Says he had some pain in his left ankle immediately but after a few minutes was able to limp away.  He states that he was in [place] yesterday when this happened and then he drove home last night.  States that he went to work today but he was still having pain with walking so he came here for further evaluation.  He has not taken any medications for his symptoms or applied any ice.  Denies previous injury.  No numbness or tingling.        Review of Systems   Constitutional: Negative for chills and fever.   HENT: Negative for sore throat.    Eyes: Negative for blurred vision.   Respiratory: Negative for shortness of breath.    Cardiovascular: Negative for chest pain and palpitations.   Gastrointestinal: Negative for nausea and vomiting.   Musculoskeletal: Positive for joint pain (left ankle).   Neurological: Negative for tingling and sensory change.       PMH:  has a past medical history of Back pain, Chickenpox, Hyperlipidemia, Sleep apnea, and Type 2 diabetes, diet controlled (HCC) (12/12/2018). He also has no past medical history of Asthma.  MEDS:   Current Outpatient Medications:   •  metFORMIN ER (GLUCOPHAGE XR) 500 MG TABLET SR 24 HR, Start 1 tab PO BID with meals gradually increase to 2 tabs PO BID with meals, Disp: 120 Tab, Rfl: 5  •  Acetaminophen (ARTHRITIS PAIN RELIEF PO), Take  by mouth., Disp: , Rfl:   •  ibuprofen (MOTRIN) 200 MG Tab, Take 200 mg by mouth every 6 hours as needed., Disp: , Rfl:   •  meloxicam (MOBIC) 15 MG tablet, Take 1 Tab by mouth every day.  "(Patient not taking: Reported on 8/11/2020), Disp: 30 Tab, Rfl: 5  •  Meloxicam 15 MG TABLET DISPERSIBLE, Take 1 Tab by mouth 1 time daily as needed. (Patient not taking: Reported on 8/11/2020), Disp: 30 Tab, Rfl: 3  ALLERGIES: No Known Allergies  SURGHX:   Past Surgical History:   Procedure Laterality Date   • TONSILLECTOMY       SOCHX:  reports that he has quit smoking. His smoking use included cigarettes. He has a 3.75 pack-year smoking history. He has never used smokeless tobacco. He reports current alcohol use. He reports that he does not use drugs.  FH: Family history was reviewed, no pertinent findings to report     Objective:     /76   Pulse 99   Temp 37.1 °C (98.8 °F)   Resp 16   Ht 1.575 m (5' 2\")   Wt 85.7 kg (189 lb)   SpO2 100%   BMI 34.57 kg/m²      Physical Exam  Vitals signs and nursing note reviewed.   Constitutional:       Appearance: He is well-developed.   HENT:      Head: Normocephalic and atraumatic.      Right Ear: External ear normal.      Left Ear: External ear normal.   Eyes:      Conjunctiva/sclera: Conjunctivae normal.      Pupils: Pupils are equal, round, and reactive to light.   Cardiovascular:      Rate and Rhythm: Normal rate and regular rhythm.      Heart sounds: Normal heart sounds. No murmur.   Pulmonary:      Effort: Pulmonary effort is normal.      Breath sounds: Normal breath sounds. No wheezing.   Musculoskeletal:      Left ankle: He exhibits swelling and ecchymosis. He exhibits normal range of motion and no deformity. Tenderness. Lateral malleolus tenderness found. No medial malleolus, no head of 5th metatarsal and no proximal fibula tenderness found. Achilles tendon normal.   Skin:     General: Skin is warm and dry.      Capillary Refill: Capillary refill takes less than 2 seconds.   Neurological:      Mental Status: He is alert and oriented to person, place, and time.   Psychiatric:         Behavior: Behavior normal.         Judgment: Judgment normal. "           DX-ANKLE 3+ VIEWS LEFT  IMPRESSION:     1.  Negative for left ankle fracture or malalignment     2.  Mild osteoarthritis of the midfoot     3.  Calcaneal spurring        *X-rays were reviewed and interpreted independently by me. I agree with the radiologist's findings        Assessment/Plan:       1. Sprain of left ankle, unspecified ligament, initial encounter  - DX-ANKLE 3+ VIEWS LEFT; Future        -Patient did not think he would be able to rest his ankle very much over the next few days so a tall walking boot was provided  - Apply ice multiple times per day  - OTC NSAIDs  - Keep elevated as much as possible  - Follow up with PCP if no improvement after a few days

## 2020-10-20 ENCOUNTER — OFFICE VISIT (OUTPATIENT)
Dept: MEDICAL GROUP | Facility: MEDICAL CENTER | Age: 55
End: 2020-10-20
Payer: COMMERCIAL

## 2020-10-20 VITALS
HEIGHT: 64 IN | RESPIRATION RATE: 17 BRPM | HEART RATE: 78 BPM | SYSTOLIC BLOOD PRESSURE: 146 MMHG | OXYGEN SATURATION: 97 % | BODY MASS INDEX: 32.61 KG/M2 | TEMPERATURE: 98.2 F | WEIGHT: 191 LBS | DIASTOLIC BLOOD PRESSURE: 90 MMHG

## 2020-10-20 DIAGNOSIS — E11.65 UNCONTROLLED TYPE 2 DIABETES MELLITUS WITH HYPERGLYCEMIA (HCC): ICD-10-CM

## 2020-10-20 DIAGNOSIS — H61.21 RIGHT EAR IMPACTED CERUMEN: ICD-10-CM

## 2020-10-20 DIAGNOSIS — G89.29 CHRONIC NECK PAIN: ICD-10-CM

## 2020-10-20 DIAGNOSIS — M79.2 RADICULAR PAIN IN LEFT ARM: ICD-10-CM

## 2020-10-20 DIAGNOSIS — M54.2 CHRONIC NECK PAIN: ICD-10-CM

## 2020-10-20 PROCEDURE — 99214 OFFICE O/P EST MOD 30 MIN: CPT | Performed by: NURSE PRACTITIONER

## 2020-10-20 RX ORDER — CYCLOBENZAPRINE HCL 10 MG
10 TABLET ORAL
Qty: 20 TAB | Refills: 0 | Status: SHIPPED | OUTPATIENT
Start: 2020-10-20 | End: 2021-03-22

## 2020-10-20 RX ORDER — GABAPENTIN 300 MG/1
300 CAPSULE ORAL 3 TIMES DAILY
Qty: 90 CAP | Refills: 0 | Status: SHIPPED | OUTPATIENT
Start: 2020-10-20 | End: 2021-03-22

## 2020-10-20 NOTE — ASSESSMENT & PLAN NOTE
Diagnosed in August of this year at which time his A1c was 11.8.  He had been experiencing quite a bit of polyuria.  He is now on Metformin 1000 mg twice daily, states that he is doing well.  The polyuria is resolved.  He will be repeating labs next month.  He is cut out sodas and is being more careful about his diet  No diarrhea, blurred vision, nausea

## 2020-10-20 NOTE — ASSESSMENT & PLAN NOTE
Longstanding problem with intermittent flares, pain often radiates into the left arm and is accompanied by numbness and tingling.  He has muscle tension in the left neck, sometimes tender to touch.  No particular history of injury.  He did have an x-ray in October 2018 which showed mild disc space narrowing at C5-6 and C6-7.  Current flare started about 2 weeks ago, he is unsure what may have triggered it.  He also feels that he has some weakness in the left arm.  He has been taking ibuprofen without any relief.  Denies frequent headaches, change in range of motion, left shoulder joint pain or change in range of motion.  No chest pain  He did go to physical therapy for this in the past which did help at the time.  He would like to start with conservative treatment options and home exercises

## 2020-10-20 NOTE — ASSESSMENT & PLAN NOTE
Plugged sensation in the right ear and decreased hearing ongoing for a few weeks.  He has tried some over-the-counter earwax drops without success

## 2020-10-20 NOTE — PROGRESS NOTES
Subjective:     Chief Complaint   Patient presents with   • Shoulder Pain     L shoulder/ neck tense 3 weeks, possible pinched nerve     EfrenJerry Hanna is a 55 y.o. male here today to follow up on:    Chronic neck pain  Longstanding problem with intermittent flares, pain often radiates into the left arm and is accompanied by numbness and tingling.  He has muscle tension in the left neck, sometimes tender to touch.  No particular history of injury.  He did have an x-ray in October 2018 which showed mild disc space narrowing at C5-6 and C6-7.  Current flare started about 2 weeks ago, he is unsure what may have triggered it.  He also feels that he has some weakness in the left arm.  He has been taking ibuprofen without any relief.  Denies frequent headaches, change in range of motion, left shoulder joint pain or change in range of motion.  No chest pain  He did go to physical therapy for this in the past which did help at the time.  He would like to start with conservative treatment options and home exercises    Uncontrolled type 2 diabetes mellitus with hyperglycemia (HCC)  Diagnosed in August of this year at which time his A1c was 11.8.  He had been experiencing quite a bit of polyuria.  He is now on Metformin 1000 mg twice daily, states that he is doing well.  The polyuria is resolved.  He will be repeating labs next month.  He is cut out sodas and is being more careful about his diet  No diarrhea, blurred vision, nausea    Right ear impacted cerumen  Plugged sensation in the right ear and decreased hearing ongoing for a few weeks.  He has tried some over-the-counter earwax drops without success       Current medicines (including changes today)  Current Outpatient Medications   Medication Sig Dispense Refill   • Meloxicam 15 MG TABLET DISPERSIBLE Take 1 Tab by mouth 1 time daily as needed. 30 Tab 3   • cyclobenzaprine (FLEXERIL) 10 mg Tab Take 1 Tab by mouth every bedtime. 20 Tab 0   • gabapentin  "(NEURONTIN) 300 MG Cap Take 1 Cap by mouth 3 times a day. 90 Cap 0   • metFORMIN ER (GLUCOPHAGE XR) 500 MG TABLET SR 24 HR Start 1 tab PO BID with meals gradually increase to 2 tabs PO BID with meals 120 Tab 5   • Acetaminophen (ARTHRITIS PAIN RELIEF PO) Take  by mouth.     • ibuprofen (MOTRIN) 200 MG Tab Take 200 mg by mouth every 6 hours as needed.       No current facility-administered medications for this visit.      He  has a past medical history of Back pain, Chickenpox, Hyperlipidemia, Sleep apnea, and Type 2 diabetes, diet controlled (HCC) (12/12/2018). He also has no past medical history of Asthma.    ROS included above     Objective:     /90 (BP Location: Right arm, Patient Position: Sitting, BP Cuff Size: Adult)   Pulse 78   Temp 36.8 °C (98.2 °F) (Temporal)   Resp 17   Ht 1.626 m (5' 4\")   Wt 86.6 kg (191 lb)   SpO2 97%  Body mass index is 32.79 kg/m².     Physical Exam:  General: Alert, oriented in no acute distress.  Eye contact is good, speech is normal, affect calm  HEENT: Right TM occluded with cerumen.  Left TM gray and reflective, good landmarks.  Ear irrigated by the medical assistant, repeat exam shows right TM intact, no erythema or effusion  Lungs: clear to auscultation bilaterally, normal effort, no wheeze/ rhonchi/ rales.  CV: regular rate and rhythm, S1, S2, no murmur  MS: No point tenderness over the cervical spine, no obvious deformity.  Tenderness over the left trapezius.  Neck with full range of motion, left glenohumeral joint with full range of motion, no joint swelling or erythema.  Strength is 5 out of 5 in right upper extremity, 4 out of 5 on the left  Ext: no edema, color normal, vascularity normal, temperature normal    Assessment and Plan:   The following treatment plan was discussed   1. Chronic neck pain   longstanding history of neck pain and intermittent radicular symptoms in the left arm.  He has done physical therapy in the past but does not wish to pursue at " this time.  We will start oral meloxicam, gabapentin 3 times daily.  Flexeril for nighttime use.  Risks and side effects of medications reviewed.  If not improving in 1 to 2 weeks would consider MRI and referral to spine specialist  Meloxicam 15 MG TABLET DISPERSIBLE    cyclobenzaprine (FLEXERIL) 10 mg Tab    gabapentin (NEURONTIN) 300 MG Cap   2. Radicular pain in left arm  Meloxicam 15 MG TABLET DISPERSIBLE    cyclobenzaprine (FLEXERIL) 10 mg Tab    gabapentin (NEURONTIN) 300 MG Cap   3. Uncontrolled type 2 diabetes mellitus with hyperglycemia (HCC)   tolerating medication without difficulty, he is working on dietary change.  Labs next month   4. Right ear impacted cerumen   cleared as discussed above, well-tolerated       Followup: 1 month with labs prior         Please note that this dictation was created using voice recognition software. I have worked with consultants from the vendor as well as technical experts from [facility] [employer] to optimize the interface. I have made every reasonable attempt to correct obvious errors, but I expect that there are errors of grammar and possibly content that I did not discover before finalizing the note.

## 2021-02-26 ENCOUNTER — HOSPITAL ENCOUNTER (OUTPATIENT)
Dept: LAB | Facility: MEDICAL CENTER | Age: 56
End: 2021-02-26
Attending: NURSE PRACTITIONER
Payer: COMMERCIAL

## 2021-02-26 DIAGNOSIS — E11.65 UNCONTROLLED TYPE 2 DIABETES MELLITUS WITH HYPERGLYCEMIA (HCC): ICD-10-CM

## 2021-02-26 LAB
ALBUMIN SERPL BCP-MCNC: 4.2 G/DL (ref 3.2–4.9)
ALBUMIN/GLOB SERPL: 1.4 G/DL
ALP SERPL-CCNC: 65 U/L (ref 30–99)
ALT SERPL-CCNC: 25 U/L (ref 2–50)
ANION GAP SERPL CALC-SCNC: 8 MMOL/L (ref 7–16)
AST SERPL-CCNC: 19 U/L (ref 12–45)
BILIRUB SERPL-MCNC: 0.5 MG/DL (ref 0.1–1.5)
BUN SERPL-MCNC: 11 MG/DL (ref 8–22)
CALCIUM SERPL-MCNC: 9.1 MG/DL (ref 8.4–10.2)
CHLORIDE SERPL-SCNC: 105 MMOL/L (ref 96–112)
CHOLEST SERPL-MCNC: 152 MG/DL (ref 100–199)
CO2 SERPL-SCNC: 25 MMOL/L (ref 20–33)
CREAT SERPL-MCNC: 0.74 MG/DL (ref 0.5–1.4)
EST. AVERAGE GLUCOSE BLD GHB EST-MCNC: 134 MG/DL
FASTING STATUS PATIENT QL REPORTED: NORMAL
GLOBULIN SER CALC-MCNC: 3.1 G/DL (ref 1.9–3.5)
GLUCOSE SERPL-MCNC: 120 MG/DL (ref 65–99)
HBA1C MFR BLD: 6.3 % (ref 4–5.6)
HDLC SERPL-MCNC: 52 MG/DL
LDLC SERPL CALC-MCNC: 80 MG/DL
POTASSIUM SERPL-SCNC: 4.2 MMOL/L (ref 3.6–5.5)
PROT SERPL-MCNC: 7.3 G/DL (ref 6–8.2)
SODIUM SERPL-SCNC: 138 MMOL/L (ref 135–145)
TRIGL SERPL-MCNC: 102 MG/DL (ref 0–149)

## 2021-02-26 PROCEDURE — 80061 LIPID PANEL: CPT

## 2021-02-26 PROCEDURE — 83036 HEMOGLOBIN GLYCOSYLATED A1C: CPT

## 2021-02-26 PROCEDURE — 80053 COMPREHEN METABOLIC PANEL: CPT

## 2021-02-26 PROCEDURE — 36415 COLL VENOUS BLD VENIPUNCTURE: CPT

## 2021-03-15 ENCOUNTER — OFFICE VISIT (OUTPATIENT)
Dept: URGENT CARE | Facility: CLINIC | Age: 56
End: 2021-03-15
Payer: COMMERCIAL

## 2021-03-15 ENCOUNTER — APPOINTMENT (OUTPATIENT)
Dept: RADIOLOGY | Facility: IMAGING CENTER | Age: 56
End: 2021-03-15
Attending: PHYSICIAN ASSISTANT
Payer: COMMERCIAL

## 2021-03-15 VITALS
WEIGHT: 195 LBS | SYSTOLIC BLOOD PRESSURE: 118 MMHG | OXYGEN SATURATION: 96 % | TEMPERATURE: 99.2 F | HEART RATE: 94 BPM | RESPIRATION RATE: 20 BRPM | HEIGHT: 64 IN | DIASTOLIC BLOOD PRESSURE: 70 MMHG | BODY MASS INDEX: 33.29 KG/M2

## 2021-03-15 DIAGNOSIS — M25.551 RIGHT HIP PAIN: ICD-10-CM

## 2021-03-15 PROCEDURE — 99214 OFFICE O/P EST MOD 30 MIN: CPT | Performed by: PHYSICIAN ASSISTANT

## 2021-03-15 PROCEDURE — 73501 X-RAY EXAM HIP UNI 1 VIEW: CPT | Mod: TC,FY,RT | Performed by: PHYSICIAN ASSISTANT

## 2021-03-15 ASSESSMENT — ENCOUNTER SYMPTOMS
FALLS: 0
TINGLING: 0
WEAKNESS: 0
CARDIOVASCULAR NEGATIVE: 1
FEVER: 0
ABDOMINAL PAIN: 0
DIARRHEA: 0
CHILLS: 0
HIP PAIN: 1
SENSORY CHANGE: 0
FOCAL WEAKNESS: 0
RESPIRATORY NEGATIVE: 1
BACK PAIN: 0
NUMBNESS: 0

## 2021-03-15 NOTE — PROGRESS NOTES
Subjective:   Judah Hanna is a 55 y.o. male who presents for Hip Pain (x 2 days, worsened yesterday, right side no injury)      Hip Pain  This is a new problem. Episode onset: 2 days ago  The problem occurs constantly. The problem has been gradually improving. Pertinent negatives include no abdominal pain, chills, fever, numbness or weakness. The symptoms are aggravated by walking (Laying on affected side. Palpation over to lateral hip). He has tried nothing for the symptoms.   Right side.   Improving today.   No injury.   No history of hip pain.   History of gout, however, he states this is usually in his toe and has not had a gout flare up in years.     Review of Systems   Constitutional: Negative for chills and fever.   Respiratory: Negative.    Cardiovascular: Negative.    Gastrointestinal: Negative for abdominal pain and diarrhea.   Genitourinary: Negative.    Musculoskeletal: Negative for back pain and falls.        Right hip pain    Neurological: Negative for tingling, sensory change, focal weakness, weakness and numbness.       Medications:    • ARTHRITIS PAIN RELIEF PO  • cyclobenzaprine Tabs  • gabapentin Caps  • ibuprofen Tabs  • Meloxicam Tbdp  • metFORMIN ER Tb24    Allergies: Patient has no known allergies.    Problem List: Judah Hanna has Gout; History of hematuria; Gastroesophageal reflux disease without esophagitis; JOSE (obstructive sleep apnea); Obesity (BMI 30-39.9); PVC's (premature ventricular contractions); Musculoskeletal back pain; Multiple benign nevi; Controlled type 2 diabetes mellitus without complication, without long-term current use of insulin (HCC); Elevated PSA; Fatty liver; Chronic pain of left knee; Chronic neck pain; Chronic pain of both knees; Right foot pain; Benign growth on outer layer of eye, right; Uncontrolled type 2 diabetes mellitus with hyperglycemia (HCC); and Right ear impacted cerumen on their problem list.    Surgical  "History:  Past Surgical History:   Procedure Laterality Date   • TONSILLECTOMY         Past Social Hx: Judah Hanna  reports that he has quit smoking. His smoking use included cigarettes. He has a 3.75 pack-year smoking history. He has never used smokeless tobacco. He reports previous alcohol use. He reports that he does not use drugs.     Past Family Hx:  Judah Hanna family history includes Diabetes in his mother and sister; Lung Disease in his father.     Problem list, medications, and allergies reviewed by myself today in Epic.     Objective:     /70 (BP Location: Right arm, Patient Position: Standing, BP Cuff Size: Adult)   Pulse 94   Temp 37.3 °C (99.2 °F) (Temporal)   Resp 20   Ht 1.626 m (5' 4\")   Wt 88.5 kg (195 lb)   SpO2 96%   BMI 33.47 kg/m²     Physical Exam  Vitals reviewed.   Constitutional:       General: He is not in acute distress.     Appearance: Normal appearance. He is not ill-appearing or toxic-appearing.   Eyes:      Conjunctiva/sclera: Conjunctivae normal.      Pupils: Pupils are equal, round, and reactive to light.   Cardiovascular:      Rate and Rhythm: Normal rate.   Pulmonary:      Effort: Pulmonary effort is normal.   Musculoskeletal:      Right hip: Tenderness (Greater trochanter area) present. No deformity or crepitus. Normal range of motion. Normal strength.      Comments: Back: Full range of flexion, extension, rotation, lateralization.   Negative TTP.   Negative midline tenderness, bony tenderness, crepitus, deformities, or step-offs.  Negative straight leg raise    Negative skin changes, erythema, ecchymosis, or edema.          Skin:     General: Skin is warm and dry.   Neurological:      General: No focal deficit present.      Mental Status: He is alert and oriented to person, place, and time.      Deep Tendon Reflexes:      Reflex Scores:       Patellar reflexes are 2+ on the right side and 2+ on the left side.       Achilles " reflexes are 2+ on the right side and 2+ on the left side.     Comments: Strength 5/5 to lower extremities    Psychiatric:         Mood and Affect: Mood normal.         Behavior: Behavior normal.       DX: Right Hip   COMPARISON: None     FINDINGS:  There is no fracture or dislocation.  The visualized osseous structures are in anatomic alignment.  Joint spaces are preserved.  Bone mineralization is age-appropriate..  Small enthesophytes about the greater trochanters.     IMPRESSION:     No significant abnormality.    Assessment/Associated Orders     1. Right hip pain  DX-HIP-UNILATERAL-WITH PELVIS-1 VIEW RIGHT       Medical Decision Making      This is a well-appearing 55-year-old male who presents with onset of right hip pain he woke up with 2 days ago.  The pain is his lateral hip and he is able to point to the area of pain worse with movements and weight bearing. Exam shows tenderness over the greater trochanter area.  Patient denies any known trauma or injury.  He does not have any low back pain today. No radiation pain.  He has normal strength to his hip.  He does have a limp secondary to the pain. No overlying skin changes. Results per radiologist interpretation above. I personally reviewed images and radiologist report. History of gout, however, he reports this is controlled and he has not had a gout flare up in years. Usually located to his toe. Signs and symptoms suspicious for possible Greater Trochanteric Pain Syndrome. His pain his improving a lot from yesterday.     Recommend ibuprofen as directed. Activity modification. Stretches.   ?Minimize stair climbing, walking up hills  ?Avoid hip adduction across the midline  ?Sit with hips positioned higher than knees; avoid crossing legs while sitting  ?Stand with equal weightbearing through lower limbs  ?Avoid side-lying to reduce compressive tendon load  Patient declined PT evaluation. He wants to try conservative treatment and activity modification first.      I personally reviewed prior external notes and test results pertinent to today's visit. Red flags discussed  Supportive care, differential diagnoses, and indications for immediate follow-up discussed with patient.    Patient expresses understanding and agrees to plan. Patient denies any other questions or concerns.     Advised the patient to follow-up with the primary care physician for recheck, reevaluation, and consideration of further management.    Please note that this dictation was created using voice recognition software. I have made a reasonable attempt to correct obvious errors, but I expect that there are errors of grammar and possibly content that I did not discover before finalizing the note.    This note was electronically signed by Jun Santana PA-C

## 2021-03-15 NOTE — PATIENT INSTRUCTIONS
Trochanteric Bursitis  Trochanteric bursitis is a condition that causes hip pain. Trochanteric bursitis happens when fluid-filled sacs (bursae) in the hip get irritated. Normally these sacs absorb shock and help strong bands of tissue (tendons) in your hip glide smoothly over each other and over your hip bones.  What are the causes?  This condition results from increased friction between the hip bones and the tendons that go over them. This condition can happen if you:  · Have weak hips.  · Use your hip muscles too much (overuse).  · Get hit in the hip.  What increases the risk?  This condition is more likely to develop in:  · Women.  · Adults who are middle-aged or older.  · People with arthritis or a spinal condition.  · People with weak buttocks muscles (gluteal muscles).  · People who have one leg that is shorter than the other.  · People who participate in certain kinds of athletic activities, such as:  ¨ Running sports, especially long-distance running.  ¨ Contact sports, like football or martial arts.  ¨ Sports in which falls may occur, like skiing.  What are the signs or symptoms?  The main symptom of this condition is pain and tenderness over the point of your hip. The pain may be:  · Sharp and intense.  · Dull and achy.  · Felt on the outside of your thigh.  It may increase when you:  · Lie on your side.  · Walk or run.  · Go up on stairs.  · Sit.  · Stand up after sitting.  · Stand for long periods of time.  How is this diagnosed?  This condition may be diagnosed based on:  · Your symptoms.  · Your medical history.  · A physical exam.  · Imaging tests, such as:  ¨ X-rays to check your bones.  ¨ An MRI or ultrasound to check your tendons and muscles.  During your physical exam, your health care provider will check the movement and strength of your hip. He or she may press on the point of your hip to check for pain.  How is this treated?  This condition may be treated by:  · Resting.  · Reducing your  activity.  · Avoiding activities that cause pain.  · Using crutches, a cane, or a walker to decrease the strain on your hip.  · Taking medicine to help with swelling.  · Having medicine injected into the bursae to help with swelling.  · Using ice, heat, and massage therapy for pain relief.  · Physical therapy exercises for strength and flexibility.  · Surgery (rare).  Follow these instructions at home:  Activity  · Rest.  · Avoid activities that cause pain.  · Return to your normal activities as told by your health care provider. Ask your health care provider what activities are safe for you.  Managing pain, stiffness, and swelling  · Take over-the-counter and prescription medicines only as told by your health care provider.  · If directed, apply heat to the injured area as told by your health care provider.  ¨ Place a towel between your skin and the heat source.  ¨ Leave the heat on for 20-30 minutes.  ¨ Remove the heat if your skin turns bright red. This is especially important if you are unable to feel pain, heat, or cold. You may have a greater risk of getting burned.  · If directed, apply ice to the injured area:  ¨ Put ice in a plastic bag.  ¨ Place a towel between your skin and the bag.  ¨ Leave the ice on for 20 minutes, 2-3 times a day.  General instructions  · If the affected leg is one that you use for driving, ask your health care provider when it is safe to drive.  · Use crutches, a cane, or a walker as told by your health care provider.  · If one of your legs is shorter than the other, get fitted for a shoe insert.  · Lose weight if you are overweight.  How is this prevented?  · Wear supportive footwear that is appropriate for your sport.  · If you have hip pain, start any new exercise or sport slowly.  · Maintain physical fitness, including:  ¨ Strength.  ¨ Flexibility.  Contact a health care provider if:  · Your pain does not improve with 2-4 weeks.  Get help right away if:  · You develop severe  pain.  · You have a fever.  · You develop increased redness over your hip.  · You have a change in your bowel function or bladder function.  · You cannot control the muscles in your feet.  This information is not intended to replace advice given to you by your health care provider. Make sure you discuss any questions you have with your health care provider.  Document Released: 01/25/2006 Document Revised: 08/23/2017 Document Reviewed: 12/02/2016  [email or web address] Interactive Patient Education © 2017 [employer]

## 2021-03-18 ENCOUNTER — TELEPHONE (OUTPATIENT)
Dept: MEDICAL GROUP | Facility: MEDICAL CENTER | Age: 56
End: 2021-03-18

## 2021-03-18 NOTE — TELEPHONE ENCOUNTER
ESTABLISHED PATIENT PRE-VISIT PLANNING     Patient was NOT contacted to complete PVP.     Note: Patient will not be contacted if there is no indication to call.     1.  Reviewed notes from the last few office visits within the medical group: Yes    2.  If any orders were placed at last visit or intended to be done for this visit (i.e. 6 mos follow-up), do we have Results/Consult Notes?         •  Labs - Labs ordered, completed on 02/26/21 and results are in chart.  Note: If patient appointment is for lab review and patient did not complete labs, check with provider if OK to reschedule patient until labs completed.       •  Imaging - Imaging was not ordered at last office visit.       •  Referrals - No referrals were ordered at last office visit.    3. Is this appointment scheduled as a Hospital Follow-Up? No    4.  Immunizations were updated in Epic using Reconcile Outside Information activity? Yes    5.  Patient is due for the following Health Maintenance Topics:   Health Maintenance Due   Topic Date Due   • DIABETES MONOFILAMENT / LE EXAM  Never done   • IMM HEP B VACCINE (1 of 3 - Risk 3-dose series) Never done   • IMM DTaP/Tdap/Td Vaccine (1 - Tdap) Never done   • IMM ZOSTER VACCINES (1 of 2) Never done   • IMM PNEUMOCOCCAL VACCINE: 0-64 Years (1 of 1 - PPSV23) 02/27/2020   • IMM INFLUENZA (1) 09/01/2020   • URINE ACR / MICROALBUMIN  01/16/2021   • RETINAL SCREENING  03/30/2021

## 2021-03-22 ENCOUNTER — OFFICE VISIT (OUTPATIENT)
Dept: MEDICAL GROUP | Facility: MEDICAL CENTER | Age: 56
End: 2021-03-22
Payer: COMMERCIAL

## 2021-03-22 VITALS
HEART RATE: 84 BPM | DIASTOLIC BLOOD PRESSURE: 80 MMHG | SYSTOLIC BLOOD PRESSURE: 116 MMHG | HEIGHT: 65 IN | RESPIRATION RATE: 20 BRPM | OXYGEN SATURATION: 94 % | TEMPERATURE: 97.9 F | WEIGHT: 192.9 LBS | BODY MASS INDEX: 32.14 KG/M2

## 2021-03-22 DIAGNOSIS — E11.9 CONTROLLED TYPE 2 DIABETES MELLITUS WITHOUT COMPLICATION, WITHOUT LONG-TERM CURRENT USE OF INSULIN (HCC): ICD-10-CM

## 2021-03-22 DIAGNOSIS — Z23 NEED FOR VACCINATION: ICD-10-CM

## 2021-03-22 DIAGNOSIS — G47.33 OSA (OBSTRUCTIVE SLEEP APNEA): ICD-10-CM

## 2021-03-22 DIAGNOSIS — R43.0 ANOSMIA: ICD-10-CM

## 2021-03-22 DIAGNOSIS — M25.551 RIGHT HIP PAIN: ICD-10-CM

## 2021-03-22 DIAGNOSIS — R97.20 ELEVATED PSA: ICD-10-CM

## 2021-03-22 DIAGNOSIS — E66.9 OBESITY (BMI 30-39.9): ICD-10-CM

## 2021-03-22 PROBLEM — E11.65 UNCONTROLLED TYPE 2 DIABETES MELLITUS WITH HYPERGLYCEMIA (HCC): Status: RESOLVED | Noted: 2020-08-11 | Resolved: 2021-03-22

## 2021-03-22 PROCEDURE — 90471 IMMUNIZATION ADMIN: CPT | Performed by: NURSE PRACTITIONER

## 2021-03-22 PROCEDURE — 90732 PPSV23 VACC 2 YRS+ SUBQ/IM: CPT | Performed by: NURSE PRACTITIONER

## 2021-03-22 PROCEDURE — 90472 IMMUNIZATION ADMIN EACH ADD: CPT | Performed by: NURSE PRACTITIONER

## 2021-03-22 PROCEDURE — 99214 OFFICE O/P EST MOD 30 MIN: CPT | Mod: 25 | Performed by: NURSE PRACTITIONER

## 2021-03-22 PROCEDURE — 90750 HZV VACC RECOMBINANT IM: CPT | Performed by: NURSE PRACTITIONER

## 2021-03-22 PROCEDURE — 90715 TDAP VACCINE 7 YRS/> IM: CPT | Performed by: NURSE PRACTITIONER

## 2021-03-22 RX ORDER — MELOXICAM 15 MG/1
15 TABLET ORAL
Qty: 30 TABLET | Refills: 5 | Status: SHIPPED | OUTPATIENT
Start: 2021-03-22

## 2021-03-22 RX ORDER — METFORMIN HYDROCHLORIDE 500 MG/1
TABLET, EXTENDED RELEASE ORAL
Qty: 120 TABLET | Refills: 5 | Status: SHIPPED | OUTPATIENT
Start: 2021-03-22 | End: 2021-09-20

## 2021-03-22 ASSESSMENT — PATIENT HEALTH QUESTIONNAIRE - PHQ9: CLINICAL INTERPRETATION OF PHQ2 SCORE: 0

## 2021-03-22 NOTE — PATIENT INSTRUCTIONS
Hip Bursitis Rehab  Ask your health care provider which exercises are safe for you. Do exercises exactly as told by your health care provider and adjust them as directed. It is normal to feel mild stretching, pulling, tightness, or discomfort as you do these exercises. Stop right away if you feel sudden pain or your pain gets worse. Do not begin these exercises until told by your health care provider.  Stretching exercise  This exercise warms up your muscles and joints and improves the movement and flexibility of your hip. This exercise also helps to relieve pain and stiffness.  Iliotibial band stretch  An iliotibial band is a strong band of muscle tissue that runs from the outer side of your hip to the outer side of your thigh and knee.  1. Lie on your side with your left / right leg in the top position.  2. Bend your left / right knee and grab your ankle. Stretch out your bottom arm to help you balance.  3. Slowly bring your knee back so your thigh is behind your body.  4. Slowly lower your knee toward the floor until you feel a gentle stretch on the outside of your left / right thigh. If you do not feel a stretch and your knee will not fall farther, place the heel of your other foot on top of your knee and pull your knee down toward the floor with your foot.  5. Hold this position for __________ seconds.  6. Slowly return to the starting position.  Repeat __________ times. Complete this exercise __________ times a day.  Strengthening exercises  These exercises build strength and endurance in your hip and pelvis. Endurance is the ability to use your muscles for a long time, even after they get tired.  Bridge  This exercise strengthens the muscles that move your thigh backward (hip extensors).  1. Lie on your back on a firm surface with your knees bent and your feet flat on the floor.  2. Tighten your buttocks muscles and lift your buttocks off the floor until your trunk is level with your thighs.  ? Do not arch  your back.  ? You should feel the muscles working in your buttocks and the back of your thighs. If you do not feel these muscles, slide your feet 1-2 inches (2.5-5 cm) farther away from your buttocks.  ? If this exercise is too easy, try doing it with your arms crossed over your chest.  3. Hold this position for __________ seconds.  4. Slowly lower your hips to the starting position.  5. Let your muscles relax completely after each repetition.  Repeat __________ times. Complete this exercise __________ times a day.  Squats  This exercise strengthens the muscles in front of your thigh and knee (quadriceps).  1.  front of a table, with your feet and knees pointing straight ahead. You may rest your hands on the table for balance but not for support.  2. Slowly bend your knees and lower your hips like you are going to sit in a chair.  ? Keep your weight over your heels, not over your toes.  ? Keep your lower legs upright so they are parallel with the table legs.  ? Do not let your hips go lower than your knees.  ? Do not bend lower than told by your health care provider.  ? If your hip pain increases, do not bend as low.  3. Hold the squat position for __________ seconds.  4. Slowly push with your legs to return to standing. Do not use your hands to pull yourself to standing.  Repeat __________ times. Complete this exercise __________ times a day.  Hip hike  1. Stand sideways on a bottom step. Stand on your left / right leg with your other foot unsupported next to the step. You can hold on to the railing or wall for balance if needed.  2. Keep your knees straight and your torso square. Then lift your left / right hip up toward the ceiling.  3. Hold this position for __________ seconds.  4. Slowly let your left / right hip lower toward the floor, past the starting position. Your foot should get closer to the floor. Do not lean or bend your knees.  Repeat __________ times. Complete this exercise __________ times a  day.  Single leg stand  1. Without shoes, stand near a railing or in a doorway. You may hold on to the railing or door frame as needed for balance.  2. Squeeze your left / right buttock muscles, then lift up your other foot.  ? Do not let your left / right hip push out to the side.  ? It is helpful to  front of a mirror for this exercise so you can watch your hip.  3. Hold this position for __________ seconds.  Repeat __________ times. Complete this exercise __________ times a day.  This information is not intended to replace advice given to you by your health care provider. Make sure you discuss any questions you have with your health care provider.  Document Released: 01/25/2006 Document Revised: 04/13/2020 Document Reviewed: 04/13/2020  [employer] Patient Education © 2020 [employer]        [email or web address]    https://www.youtube.com/watch?v=Qwi9vt5hONz     [email or web address]

## 2021-03-22 NOTE — ASSESSMENT & PLAN NOTE
This is a new problem. Alysha/o R hip pain that began Saturday 3/13/21 and lasted 3 days with resolution on Tuesday morning 3/16/21  He woke up on Saturday with R hip pain associated with swellin.   These symptoms got better and worse for 3 days and resolved on their own on Tuesday morning  His pain was so severe he had to use the trash can those days to help with walking  He went to  on 3/15/21 and x-rays done did not show any fractures or arthritis   Denies any injuries, redness, stiffness  Denies any hip swelling, pain, redness at this visit today.   These symptoms have not return but he is afraid they may come back

## 2021-03-22 NOTE — NON-PROVIDER
Judah is a 55 year old male here for his annual and the following concerns:      Controlled type 2 diabetes mellitus without complication, without long-term current use of insulin (HCC)  Controlled with Metformin 500 mg BID  Pt forgets to take his medication sometimes in the am but always remembers to take it in the evening and he takes 1000mg at night when he forgets his am dose.   He attends to follow a healthy diet, he eats carbs like tortillas, rice and bread but it is working on his portions  He walks a lot for work and feels he gets plenty of exercise this way  He drinks alcohol occasionally twice/month  He does not smoke  Denies stress in his life at this time, he works as a .   Denies numbness or tingling.     Anosmia  This is a new problem. Pt had covid back in November and c/o intermittent lack of smell since then.  Judah sense of smell is present with strong odors like chemicals or colognes but absent with other odors.   This problem is not  bothersome to him but he would like to know what he can do about it  He is able to taste the food as normal    Right hip pain  This is a new problem. Alysha/o R hip pain that began Saturday 3/13/21 and lasted 3 days with resolution on Tuesday morning 3/16/21  He woke up on Saturday with R hip pain associated with swellin.   These symptoms got better and worse for 3 days and resolved on their own on Tuesday morning  His pain was so severe he had to use the trash can those days to help with walking  He went to  on 3/15/21 and x-rays done did not show any fractures or arthritis   Denies any injuries, redness, stiffness  Denies any hip swelling, pain, redness at this visit today.   These symptoms have not return but he is afraid they may come back      1. Controlled type 2 diabetes mellitus without complication, without long-term current use of insulin (HCC)  Continue taking medications as prescribed  Stay well hydrated  Diet with a well balance source  of protein, fruits, vegetables and small portions of carbs.   - HEMOGLOBIN A1C; Future  - Comp Metabolic Panel; Future    2. JOSE (obstructive sleep apnea)      3. Elevated PSA  I have placed the following orders for screening.   - PROSTATE SPECIFIC AG SCREENING; Future    4. Right hip pain  R hip pain and swelling that began last Saturday on 3/13/21 and resolved on its own after 3 days. sray at  visit on 3/15/21 showed no fracture or arthritis.   Denies any symptoms since then   On exam ROM was normal with no adduction or abduction pain, no pain to SI joint.  Pain was appreciated with pressure to trochanteric area.    I discussed with patient the options for treatment. I discussed PT and NSAID.   Pt had an Rx for meloxicam in the past and would like to have that to use PRN for pain.   Educated take medication with food, and not to combine with other NSAIDs and only use PRN for pain.   - metFORMIN ER (GLUCOPHAGE XR) 500 MG TABLET SR 24 HR; Start 1 tab PO BID with meals gradually increase to 2 tabs PO BID with meals  Dispense: 120 tablet; Refill: 5  - meloxicam (MOBIC) 15 MG tablet; Take 1 tablet by mouth 1 time a day as needed.  Dispense: 30 tablet; Refill: 5    5. Anosmia  Intermittent lack of smell since COVID infection back in November.   Judah is able to smell strong odors such as chemicals and colognes but not other smells  He is able to taste normal  I discussed with him the benefits of Flonase once/day x6 weeks and olfactory training therapy x6 weeks. I provided printed material for guidance on olfactory training therapy.     6. Obesity (BMI 30-39.9)    7. Need for vaccination  I have placed the following order for screening.   - Shingles Vaccine (Shingrix)  - Tdap =>8yo IM  - Pneumococal Polysaccharide Vaccine 23-Valent =>3YO SQ/IM

## 2021-03-22 NOTE — ASSESSMENT & PLAN NOTE
Improved with a1c now at 6.3  Consistent with metformin 1000 mg daily  Recent kidney function normal  Walking through the work day, no planned exercise. Watching his diet

## 2021-03-22 NOTE — ASSESSMENT & PLAN NOTE
Patient's PSA had increased in January 2000 19-6.2, he was referred to urology for evaluation at that time.  They watched his lab work which did come back down to normal, February 2019 was 5.0, September 2019 3.8.  Plan was to continue to monitor with annual follow-up.  His father did have prostate cancer.

## 2021-03-22 NOTE — PROGRESS NOTES
Subjective:     Chief Complaint   Patient presents with   • Follow-Up     labs done to review, R hip pain x1wk      Judah Hanna is a 55 y.o. male here today to follow up on:    Controlled type 2 diabetes mellitus without complication, without long-term current use of insulin (HCC)  Improved with a1c now at 6.3  Consistent with metformin 1000 mg daily  Recent kidney function normal  Walking through the work day, no planned exercise. Watching his diet      JOSE (obstructive sleep apnea)  Using CPAP    Elevated PSA  Patient's PSA had increased in January 2000 19-6.2, he was referred to urology for evaluation at that time.  They watched his lab work which did come back down to normal, February 2019 was 5.0, September 2019 3.8.  Plan was to continue to monitor with annual follow-up.  His father did have prostate cancer.    Right hip pain  This is a new problem. Alysha/o R hip pain that began Saturday 3/13/21 and lasted 3 days with resolution on Tuesday morning 3/16/21  He woke up on Saturday with R hip pain associated with swellin.   These symptoms got better and worse for 3 days and resolved on their own on Tuesday morning  His pain was so severe he had to use the trash can those days to help with walking  He went to  on 3/15/21 and x-rays done did not show any fractures or arthritis   Denies any injuries, redness, stiffness  Denies any hip swelling, pain, redness at this visit today.   These symptoms have not return but he is afraid they may come back    Anosmia  This is a new problem. Pt had covid back in November and c/o intermittent lack of smell since then.  Judah sense of smell is present with strong odors like chemicals or colognes but absent with other odors.   This problem is not  bothersome to him but he would like to know what he can do about it  He is able to taste the food as normal       Current medicines (including changes today)  Current Outpatient Medications   Medication Sig Dispense  "Refill   • metFORMIN ER (GLUCOPHAGE XR) 500 MG TABLET SR 24 HR Start 1 tab PO BID with meals gradually increase to 2 tabs PO BID with meals 120 tablet 5   • meloxicam (MOBIC) 15 MG tablet Take 1 tablet by mouth 1 time a day as needed. 30 tablet 5   • Acetaminophen (ARTHRITIS PAIN RELIEF PO) Take  by mouth.     • ibuprofen (MOTRIN) 200 MG Tab Take 200 mg by mouth every 6 hours as needed.       No current facility-administered medications for this visit.     He  has a past medical history of Back pain, Chickenpox, Hyperlipidemia, Sleep apnea, and Type 2 diabetes, diet controlled (HCC) (12/12/2018). He also has no past medical history of Asthma.    ROS included above     Objective:     /80 (BP Location: Right arm, Patient Position: Sitting, BP Cuff Size: Adult)   Pulse 84   Temp 36.6 °C (97.9 °F) (Temporal)   Resp 20   Ht 1.64 m (5' 4.57\")   Wt 87.5 kg (192 lb 14.4 oz)   SpO2 94%  Body mass index is 32.53 kg/m².     Physical Exam:  General: Alert, oriented in no acute distress.  Eye contact is good, speech is normal, affect calm  Lungs: clear to auscultation bilaterally, normal effort, no wheeze/ rhonchi/ rales.  CV: regular rate and rhythm, S1, S2, no murmur  MS: Tenderness over the right trochanteric bursa.  Hip with full range of motion  Ext: no edema, color normal, vascularity normal, temperature normal    Assessment and Plan:   The following treatment plan was discussed   1. Controlled type 2 diabetes mellitus without complication, without long-term current use of insulin ([facility])  Improved with a1c 6.3 encouraged to continue working on diet and exercise, continue Metformin  HEMOGLOBIN A1C    Comp Metabolic Panel   2. JOSE (obstructive sleep apnea)   stable   3. Elevated PSA   due for follow-up  PROSTATE SPECIFIC AG SCREENING   4. Right hip pain   history and exam consistent with trochanteric bursitis.  X-ray normal.  He is improved as of today.  Conservative treatment options reviewed, will renew " meloxicam which she has used in the past and may have on hand for future flare.  Advised to take with medication with food, do not combine with other NSAIDs.  Printed exercises given    meloxicam (MOBIC) 15 MG tablet   5. Anosmia   resources provided for olfactory retraining, encouraged trial of Flonase   6. Obesity (BMI 30-39.9)     7. Need for vaccination  I have placed the below orders and discussed them with an approved delegating provider. The MA is performing the below orders under the direction of Dr. Malcom Pérez Vaccine (Shingrix)    Tdap =>6yo IM    Pneumococal Polysaccharide Vaccine 23-Valent =>1YO SQ/IM       Followup: 6 months with labs prior         Please note that this dictation was created using voice recognition software. I have worked with consultants from the vendor as well as technical experts from [employer] [employer] to optimize the interface. I have made every reasonable attempt to correct obvious errors, but I expect that there are errors of grammar and possibly content that I did not discover before finalizing the note.

## 2021-03-22 NOTE — ASSESSMENT & PLAN NOTE
This is a new problem. Pt had covid back in November and c/o intermittent lack of smell since then.  Judah sense of smell is present with strong odors like chemicals or colognes but absent with other odors.   This problem is not  bothersome to him but he would like to know what he can do about it  He is able to taste the food as normal

## 2021-08-24 ENCOUNTER — TELEPHONE (OUTPATIENT)
Dept: MEDICAL GROUP | Facility: MEDICAL CENTER | Age: 56
End: 2021-08-24

## 2021-08-24 NOTE — TELEPHONE ENCOUNTER
.VOICEMAIL  2. Message: pt contacted office to obtain order for new Cpap machine.states his is broken and would like assistance with obtaining a new one as the cost is high.     3. Patient approves office to leave a detailed voicemail/MyChart message: N\A

## 2021-08-26 NOTE — TELEPHONE ENCOUNTER
Please inform patient I do not manage CPAP.  Does he have a sleep medicine or pulmonology provider?  If not we will need to get him set up with the new sleep specialist

## 2021-09-17 DIAGNOSIS — E11.9 CONTROLLED TYPE 2 DIABETES MELLITUS WITHOUT COMPLICATION, WITHOUT LONG-TERM CURRENT USE OF INSULIN (HCC): ICD-10-CM

## 2021-09-20 RX ORDER — METFORMIN HYDROCHLORIDE 500 MG/1
TABLET, EXTENDED RELEASE ORAL
Qty: 60 TABLET | Refills: 0 | Status: SHIPPED | OUTPATIENT
Start: 2021-09-20

## 2022-09-30 ENCOUNTER — TELEPHONE (OUTPATIENT)
Dept: MEDICAL GROUP | Facility: MEDICAL CENTER | Age: 57
End: 2022-09-30
Payer: COMMERCIAL